# Patient Record
Sex: FEMALE | Race: BLACK OR AFRICAN AMERICAN | URBAN - METROPOLITAN AREA
[De-identification: names, ages, dates, MRNs, and addresses within clinical notes are randomized per-mention and may not be internally consistent; named-entity substitution may affect disease eponyms.]

---

## 2018-08-15 PROBLEM — R07.9 CHEST PAIN: Status: ACTIVE | Noted: 2018-08-15

## 2018-08-16 PROBLEM — J96.20 ACUTE ON CHRONIC RESPIRATORY FAILURE (HCC): Status: ACTIVE | Noted: 2018-08-16

## 2020-01-01 ENCOUNTER — APPOINTMENT (OUTPATIENT)
Dept: CT IMAGING | Age: 60
DRG: 871 | End: 2020-01-01
Payer: COMMERCIAL

## 2020-01-01 ENCOUNTER — APPOINTMENT (OUTPATIENT)
Dept: GENERAL RADIOLOGY | Age: 60
DRG: 871 | End: 2020-01-01
Payer: COMMERCIAL

## 2020-01-01 ENCOUNTER — HOSPITAL ENCOUNTER (INPATIENT)
Age: 60
LOS: 6 days | DRG: 871 | End: 2020-12-30
Attending: EMERGENCY MEDICINE | Admitting: INTERNAL MEDICINE
Payer: COMMERCIAL

## 2020-01-01 VITALS
HEART RATE: 108 BPM | BODY MASS INDEX: 27.89 KG/M2 | HEIGHT: 63 IN | RESPIRATION RATE: 16 BRPM | DIASTOLIC BLOOD PRESSURE: 52 MMHG | WEIGHT: 157.41 LBS | SYSTOLIC BLOOD PRESSURE: 88 MMHG | OXYGEN SATURATION: 95 % | TEMPERATURE: 98.3 F

## 2020-01-01 DIAGNOSIS — N28.9 ACUTE RENAL INSUFFICIENCY: ICD-10-CM

## 2020-01-01 DIAGNOSIS — L03.115 CELLULITIS OF RIGHT LOWER EXTREMITY: ICD-10-CM

## 2020-01-01 DIAGNOSIS — I73.9 PAD (PERIPHERAL ARTERY DISEASE) (HCC): ICD-10-CM

## 2020-01-01 DIAGNOSIS — A41.9 SEPTICEMIA (HCC): Primary | ICD-10-CM

## 2020-01-01 LAB
A/G RATIO: 0.9 (ref 1.1–2.2)
A/G RATIO: 0.9 (ref 1.1–2.2)
ACANTHOCYTES: ABNORMAL
ALBUMIN SERPL-MCNC: 2.7 G/DL (ref 3.4–5)
ALBUMIN SERPL-MCNC: 3 G/DL (ref 3.4–5)
ALP BLD-CCNC: 365 U/L (ref 40–129)
ALP BLD-CCNC: 424 U/L (ref 40–129)
ALT SERPL-CCNC: 10 U/L (ref 10–40)
ALT SERPL-CCNC: 10 U/L (ref 10–40)
ANION GAP SERPL CALCULATED.3IONS-SCNC: 13 MMOL/L (ref 3–16)
ANION GAP SERPL CALCULATED.3IONS-SCNC: 14 MMOL/L (ref 3–16)
ANION GAP SERPL CALCULATED.3IONS-SCNC: 15 MMOL/L (ref 3–16)
ANION GAP SERPL CALCULATED.3IONS-SCNC: 17 MMOL/L (ref 3–16)
ANION GAP SERPL CALCULATED.3IONS-SCNC: 17 MMOL/L (ref 3–16)
ANISOCYTOSIS: ABNORMAL
ANISOCYTOSIS: ABNORMAL
APTT: 151 SEC (ref 24.2–36.2)
APTT: 56.4 SEC (ref 24.2–36.2)
APTT: >248 SEC (ref 24.2–36.2)
AST SERPL-CCNC: 20 U/L (ref 15–37)
AST SERPL-CCNC: 22 U/L (ref 15–37)
BASE EXCESS ARTERIAL: -8 (ref -3–3)
BASOPHILS ABSOLUTE: 0 K/UL (ref 0–0.2)
BASOPHILS ABSOLUTE: 0.1 K/UL (ref 0–0.2)
BASOPHILS ABSOLUTE: 0.2 K/UL (ref 0–0.2)
BASOPHILS ABSOLUTE: 0.2 K/UL (ref 0–0.2)
BASOPHILS RELATIVE PERCENT: 0 %
BASOPHILS RELATIVE PERCENT: 0.4 %
BASOPHILS RELATIVE PERCENT: 0.5 %
BASOPHILS RELATIVE PERCENT: 0.7 %
BASOPHILS RELATIVE PERCENT: 0.7 %
BASOPHILS RELATIVE PERCENT: 0.8 %
BASOPHILS RELATIVE PERCENT: 0.9 %
BASOPHILS RELATIVE PERCENT: 1.3 %
BILIRUB SERPL-MCNC: 1.1 MG/DL (ref 0–1)
BILIRUB SERPL-MCNC: 1.2 MG/DL (ref 0–1)
BLOOD CULTURE, ROUTINE: NORMAL
BUN BLDV-MCNC: 33 MG/DL (ref 7–20)
BUN BLDV-MCNC: 33 MG/DL (ref 7–20)
BUN BLDV-MCNC: 34 MG/DL (ref 7–20)
BUN BLDV-MCNC: 35 MG/DL (ref 7–20)
BUN BLDV-MCNC: 36 MG/DL (ref 7–20)
BUN BLDV-MCNC: 36 MG/DL (ref 7–20)
BURR CELLS: ABNORMAL
C-REACTIVE PROTEIN: 196.1 MG/L (ref 0–5.1)
CALCIUM IONIZED: 0.72 MMOL/L (ref 1.12–1.32)
CALCIUM IONIZED: 0.84 MMOL/L (ref 1.12–1.32)
CALCIUM IONIZED: 0.86 MMOL/L (ref 1.12–1.32)
CALCIUM IONIZED: 0.89 MMOL/L (ref 1.12–1.32)
CALCIUM IONIZED: 1.02 MMOL/L (ref 1.12–1.32)
CALCIUM IONIZED: 1.03 MMOL/L (ref 1.12–1.32)
CALCIUM IONIZED: 1.09 MMOL/L (ref 1.12–1.32)
CALCIUM IONIZED: 1.17 MMOL/L (ref 1.12–1.32)
CALCIUM SERPL-MCNC: 5.6 MG/DL (ref 8.3–10.6)
CALCIUM SERPL-MCNC: 5.8 MG/DL (ref 8.3–10.6)
CALCIUM SERPL-MCNC: 5.9 MG/DL (ref 8.3–10.6)
CALCIUM SERPL-MCNC: 6.1 MG/DL (ref 8.3–10.6)
CALCIUM SERPL-MCNC: 6.6 MG/DL (ref 8.3–10.6)
CALCIUM SERPL-MCNC: 7.5 MG/DL (ref 8.3–10.6)
CALCIUM SERPL-MCNC: 8.4 MG/DL (ref 8.3–10.6)
CALCIUM SERPL-MCNC: 8.5 MG/DL (ref 8.3–10.6)
CALCIUM SERPL-MCNC: 8.7 MG/DL (ref 8.3–10.6)
CHLORIDE BLD-SCNC: 101 MMOL/L (ref 99–110)
CHLORIDE BLD-SCNC: 101 MMOL/L (ref 99–110)
CHLORIDE BLD-SCNC: 102 MMOL/L (ref 99–110)
CHLORIDE BLD-SCNC: 103 MMOL/L (ref 99–110)
CHLORIDE BLD-SCNC: 104 MMOL/L (ref 99–110)
CHLORIDE BLD-SCNC: 105 MMOL/L (ref 99–110)
CO2: 17 MMOL/L (ref 21–32)
CO2: 18 MMOL/L (ref 21–32)
CO2: 18 MMOL/L (ref 21–32)
CO2: 19 MMOL/L (ref 21–32)
CO2: 20 MMOL/L (ref 21–32)
CO2: 20 MMOL/L (ref 21–32)
CREAT SERPL-MCNC: 1.3 MG/DL (ref 0.6–1.1)
CREAT SERPL-MCNC: 1.5 MG/DL (ref 0.6–1.1)
CREAT SERPL-MCNC: 1.7 MG/DL (ref 0.6–1.1)
CREAT SERPL-MCNC: 1.8 MG/DL (ref 0.6–1.1)
CULTURE, BLOOD 2: NORMAL
D DIMER: 2142 NG/ML DDU (ref 0–229)
EOSINOPHILS ABSOLUTE: 0 K/UL (ref 0–0.6)
EOSINOPHILS ABSOLUTE: 0.1 K/UL (ref 0–0.6)
EOSINOPHILS ABSOLUTE: 0.2 K/UL (ref 0–0.6)
EOSINOPHILS ABSOLUTE: 0.4 K/UL (ref 0–0.6)
EOSINOPHILS RELATIVE PERCENT: 0.3 %
EOSINOPHILS RELATIVE PERCENT: 0.5 %
EOSINOPHILS RELATIVE PERCENT: 0.6 %
EOSINOPHILS RELATIVE PERCENT: 0.9 %
EOSINOPHILS RELATIVE PERCENT: 0.9 %
EOSINOPHILS RELATIVE PERCENT: 1.9 %
FERRITIN: 245.3 NG/ML (ref 15–150)
GFR AFRICAN AMERICAN: 35
GFR AFRICAN AMERICAN: 37
GFR AFRICAN AMERICAN: 43
GFR AFRICAN AMERICAN: 51
GFR NON-AFRICAN AMERICAN: 29
GFR NON-AFRICAN AMERICAN: 31
GFR NON-AFRICAN AMERICAN: 36
GFR NON-AFRICAN AMERICAN: 42
GLOBULIN: 3 G/DL
GLOBULIN: 3.2 G/DL
GLUCOSE BLD-MCNC: 102 MG/DL (ref 70–99)
GLUCOSE BLD-MCNC: 106 MG/DL (ref 70–99)
GLUCOSE BLD-MCNC: 115 MG/DL (ref 70–99)
GLUCOSE BLD-MCNC: 121 MG/DL (ref 70–99)
GLUCOSE BLD-MCNC: 123 MG/DL (ref 70–99)
GLUCOSE BLD-MCNC: 126 MG/DL (ref 70–99)
GLUCOSE BLD-MCNC: 131 MG/DL (ref 70–99)
GLUCOSE BLD-MCNC: 71 MG/DL (ref 70–99)
GLUCOSE BLD-MCNC: 74 MG/DL (ref 70–99)
GLUCOSE BLD-MCNC: 89 MG/DL (ref 70–99)
HCO3 ARTERIAL: 16.2 MMOL/L (ref 21–29)
HCT VFR BLD CALC: 24.8 % (ref 36–48)
HCT VFR BLD CALC: 25.8 % (ref 36–48)
HCT VFR BLD CALC: 26.5 % (ref 36–48)
HCT VFR BLD CALC: 26.7 % (ref 36–48)
HCT VFR BLD CALC: 28.2 % (ref 36–48)
HCT VFR BLD CALC: 28.3 % (ref 36–48)
HCT VFR BLD CALC: 28.8 % (ref 36–48)
HCT VFR BLD CALC: 29.3 % (ref 36–48)
HEMOGLOBIN: 10.2 GM/DL (ref 12–16)
HEMOGLOBIN: 7.8 G/DL (ref 12–16)
HEMOGLOBIN: 7.9 G/DL (ref 12–16)
HEMOGLOBIN: 8.2 G/DL (ref 12–16)
HEMOGLOBIN: 8.2 G/DL (ref 12–16)
HEMOGLOBIN: 8.4 G/DL (ref 12–16)
HEMOGLOBIN: 8.7 G/DL (ref 12–16)
HEMOGLOBIN: 9.1 G/DL (ref 12–16)
HEMOGLOBIN: 9.1 G/DL (ref 12–16)
INR BLD: 1.89 (ref 0.86–1.14)
INR BLD: 1.9 (ref 0.86–1.14)
IRON SATURATION: 8 % (ref 15–50)
IRON: 13 UG/DL (ref 37–145)
LACTATE: 2.89 MMOL/L (ref 0.4–2)
LACTIC ACID, SEPSIS: 1 MMOL/L (ref 0.4–1.9)
LACTIC ACID, SEPSIS: 1.9 MMOL/L (ref 0.4–1.9)
LACTIC ACID, SEPSIS: 2.2 MMOL/L (ref 0.4–1.9)
LACTIC ACID: 1.8 MMOL/L (ref 0.4–2)
LACTIC ACID: 2 MMOL/L (ref 0.4–2)
LACTIC ACID: 2.8 MMOL/L (ref 0.4–2)
LACTIC ACID: 2.8 MMOL/L (ref 0.4–2)
LV EF: 58 %
LVEF MODALITY: NORMAL
LYMPHOCYTES ABSOLUTE: 0.8 K/UL (ref 1–5.1)
LYMPHOCYTES ABSOLUTE: 1.1 K/UL (ref 1–5.1)
LYMPHOCYTES ABSOLUTE: 1.1 K/UL (ref 1–5.1)
LYMPHOCYTES ABSOLUTE: 1.2 K/UL (ref 1–5.1)
LYMPHOCYTES ABSOLUTE: 1.2 K/UL (ref 1–5.1)
LYMPHOCYTES ABSOLUTE: 1.4 K/UL (ref 1–5.1)
LYMPHOCYTES RELATIVE PERCENT: 10 %
LYMPHOCYTES RELATIVE PERCENT: 4.3 %
LYMPHOCYTES RELATIVE PERCENT: 5.4 %
LYMPHOCYTES RELATIVE PERCENT: 5.8 %
LYMPHOCYTES RELATIVE PERCENT: 6 %
LYMPHOCYTES RELATIVE PERCENT: 6.4 %
LYMPHOCYTES RELATIVE PERCENT: 7.1 %
LYMPHOCYTES RELATIVE PERCENT: 9.1 %
MAGNESIUM: 0.5 MG/DL (ref 1.8–2.4)
MAGNESIUM: 0.5 MG/DL (ref 1.8–2.4)
MAGNESIUM: 1.5 MG/DL (ref 1.8–2.4)
MAGNESIUM: 1.8 MG/DL (ref 1.8–2.4)
MAGNESIUM: 2 MG/DL (ref 1.8–2.4)
MCH RBC QN AUTO: 27.7 PG (ref 26–34)
MCH RBC QN AUTO: 27.8 PG (ref 26–34)
MCH RBC QN AUTO: 27.9 PG (ref 26–34)
MCH RBC QN AUTO: 28 PG (ref 26–34)
MCH RBC QN AUTO: 28.1 PG (ref 26–34)
MCH RBC QN AUTO: 28.3 PG (ref 26–34)
MCH RBC QN AUTO: 28.5 PG (ref 26–34)
MCH RBC QN AUTO: 28.7 PG (ref 26–34)
MCHC RBC AUTO-ENTMCNC: 29.7 G/DL (ref 31–36)
MCHC RBC AUTO-ENTMCNC: 30.5 G/DL (ref 31–36)
MCHC RBC AUTO-ENTMCNC: 30.8 G/DL (ref 31–36)
MCHC RBC AUTO-ENTMCNC: 30.9 G/DL (ref 31–36)
MCHC RBC AUTO-ENTMCNC: 31.1 G/DL (ref 31–36)
MCHC RBC AUTO-ENTMCNC: 31.1 G/DL (ref 31–36)
MCHC RBC AUTO-ENTMCNC: 31.3 G/DL (ref 31–36)
MCHC RBC AUTO-ENTMCNC: 31.5 G/DL (ref 31–36)
MCV RBC AUTO: 90 FL (ref 80–100)
MCV RBC AUTO: 90 FL (ref 80–100)
MCV RBC AUTO: 90.4 FL (ref 80–100)
MCV RBC AUTO: 90.5 FL (ref 80–100)
MCV RBC AUTO: 90.5 FL (ref 80–100)
MCV RBC AUTO: 91.1 FL (ref 80–100)
MCV RBC AUTO: 92.4 FL (ref 80–100)
MCV RBC AUTO: 94.7 FL (ref 80–100)
MONOCYTES ABSOLUTE: 0.9 K/UL (ref 0–1.3)
MONOCYTES ABSOLUTE: 1 K/UL (ref 0–1.3)
MONOCYTES ABSOLUTE: 1.3 K/UL (ref 0–1.3)
MONOCYTES RELATIVE PERCENT: 5 %
MONOCYTES RELATIVE PERCENT: 5.2 %
MONOCYTES RELATIVE PERCENT: 5.6 %
MONOCYTES RELATIVE PERCENT: 6.2 %
MONOCYTES RELATIVE PERCENT: 6.4 %
MONOCYTES RELATIVE PERCENT: 6.8 %
MONOCYTES RELATIVE PERCENT: 6.8 %
MONOCYTES RELATIVE PERCENT: 7.3 %
MRSA SCREEN RT-PCR: NORMAL
NEUTROPHILS ABSOLUTE: 11.9 K/UL (ref 1.7–7.7)
NEUTROPHILS ABSOLUTE: 12.6 K/UL (ref 1.7–7.7)
NEUTROPHILS ABSOLUTE: 13.3 K/UL (ref 1.7–7.7)
NEUTROPHILS ABSOLUTE: 15 K/UL (ref 1.7–7.7)
NEUTROPHILS ABSOLUTE: 15 K/UL (ref 1.7–7.7)
NEUTROPHILS ABSOLUTE: 15.8 K/UL (ref 1.7–7.7)
NEUTROPHILS ABSOLUTE: 16.3 K/UL (ref 1.7–7.7)
NEUTROPHILS ABSOLUTE: 9.8 K/UL (ref 1.7–7.7)
NEUTROPHILS RELATIVE PERCENT: 81.2 %
NEUTROPHILS RELATIVE PERCENT: 82.7 %
NEUTROPHILS RELATIVE PERCENT: 85.6 %
NEUTROPHILS RELATIVE PERCENT: 86.4 %
NEUTROPHILS RELATIVE PERCENT: 86.6 %
NEUTROPHILS RELATIVE PERCENT: 87.3 %
NEUTROPHILS RELATIVE PERCENT: 87.5 %
NEUTROPHILS RELATIVE PERCENT: 87.7 %
O2 SAT, ARTERIAL: 98 % (ref 93–100)
OVALOCYTES: ABNORMAL
OVALOCYTES: ABNORMAL
PCO2 ARTERIAL: 22.6 MM HG (ref 35–45)
PDW BLD-RTO: 21.1 % (ref 12.4–15.4)
PDW BLD-RTO: 21.2 % (ref 12.4–15.4)
PDW BLD-RTO: 21.4 % (ref 12.4–15.4)
PDW BLD-RTO: 22.3 % (ref 12.4–15.4)
PDW BLD-RTO: 22.4 % (ref 12.4–15.4)
PERFORMED ON: ABNORMAL
PERFORMED ON: ABNORMAL
PH ARTERIAL: 7.46 (ref 7.35–7.45)
PH VENOUS: 7.39 (ref 7.35–7.45)
PH VENOUS: 7.4 (ref 7.35–7.45)
PH VENOUS: 7.41 (ref 7.35–7.45)
PH VENOUS: 7.42 (ref 7.35–7.45)
PH VENOUS: 7.43 (ref 7.35–7.45)
PH VENOUS: 7.44 (ref 7.35–7.45)
PH VENOUS: 7.48 (ref 7.35–7.45)
PHOSPHORUS: 2.8 MG/DL (ref 2.5–4.9)
PHOSPHORUS: 2.9 MG/DL (ref 2.5–4.9)
PHOSPHORUS: 2.9 MG/DL (ref 2.5–4.9)
PHOSPHORUS: 3 MG/DL (ref 2.5–4.9)
PHOSPHORUS: 3 MG/DL (ref 2.5–4.9)
PHOSPHORUS: 3.1 MG/DL (ref 2.5–4.9)
PHOSPHORUS: 3.2 MG/DL (ref 2.5–4.9)
PLATELET # BLD: 231 K/UL (ref 135–450)
PLATELET # BLD: 243 K/UL (ref 135–450)
PLATELET # BLD: 251 K/UL (ref 135–450)
PLATELET # BLD: 251 K/UL (ref 135–450)
PLATELET # BLD: 285 K/UL (ref 135–450)
PLATELET # BLD: 307 K/UL (ref 135–450)
PLATELET # BLD: 326 K/UL (ref 135–450)
PLATELET # BLD: 379 K/UL (ref 135–450)
PLATELET SLIDE REVIEW: ABNORMAL
PLATELET SLIDE REVIEW: ADEQUATE
PMV BLD AUTO: 10.5 FL (ref 5–10.5)
PMV BLD AUTO: 9.1 FL (ref 5–10.5)
PMV BLD AUTO: 9.2 FL (ref 5–10.5)
PMV BLD AUTO: 9.5 FL (ref 5–10.5)
PMV BLD AUTO: 9.6 FL (ref 5–10.5)
PO2 ARTERIAL: 96.1 MM HG (ref 75–108)
POC HEMATOCRIT: 30 % (ref 36–48)
POC POTASSIUM: 3.5 MMOL/L (ref 3.5–5.1)
POC SAMPLE TYPE: ABNORMAL
POC SODIUM: 139 MMOL/L (ref 136–145)
POIKILOCYTES: ABNORMAL
POIKILOCYTES: ABNORMAL
POLYCHROMASIA: ABNORMAL
POLYCHROMASIA: ABNORMAL
POTASSIUM REFLEX MAGNESIUM: 3.7 MMOL/L (ref 3.5–5.1)
POTASSIUM SERPL-SCNC: 3.3 MMOL/L (ref 3.5–5.1)
POTASSIUM SERPL-SCNC: 3.5 MMOL/L (ref 3.5–5.1)
POTASSIUM SERPL-SCNC: 3.6 MMOL/L (ref 3.5–5.1)
POTASSIUM SERPL-SCNC: 3.7 MMOL/L (ref 3.5–5.1)
POTASSIUM SERPL-SCNC: 3.8 MMOL/L (ref 3.5–5.1)
POTASSIUM SERPL-SCNC: 3.8 MMOL/L (ref 3.5–5.1)
POTASSIUM SERPL-SCNC: 4 MMOL/L (ref 3.5–5.1)
PRO-BNP: ABNORMAL PG/ML (ref 0–124)
PRO-BNP: ABNORMAL PG/ML (ref 0–124)
PROCALCITONIN: 33.7 NG/ML (ref 0–0.15)
PROTHROMBIN TIME: 22.1 SEC (ref 10–13.2)
PROTHROMBIN TIME: 22.2 SEC (ref 10–13.2)
RBC # BLD: 2.74 M/UL (ref 4–5.2)
RBC # BLD: 2.83 M/UL (ref 4–5.2)
RBC # BLD: 2.87 M/UL (ref 4–5.2)
RBC # BLD: 2.95 M/UL (ref 4–5.2)
RBC # BLD: 2.99 M/UL (ref 4–5.2)
RBC # BLD: 3.13 M/UL (ref 4–5.2)
RBC # BLD: 3.19 M/UL (ref 4–5.2)
RBC # BLD: 3.26 M/UL (ref 4–5.2)
REASON FOR REJECTION: NORMAL
REASON FOR REJECTION: NORMAL
REJECTED TEST: NORMAL
REJECTED TEST: NORMAL
SARS-COV-2, PCR: NOT DETECTED
SCHISTOCYTES: ABNORMAL
SCHISTOCYTES: ABNORMAL
SEDIMENTATION RATE, ERYTHROCYTE: 24 MM/HR (ref 0–30)
SLIDE REVIEW: ABNORMAL
SLIDE REVIEW: ABNORMAL
SODIUM BLD-SCNC: 135 MMOL/L (ref 136–145)
SODIUM BLD-SCNC: 136 MMOL/L (ref 136–145)
SODIUM BLD-SCNC: 139 MMOL/L (ref 136–145)
SODIUM BLD-SCNC: 139 MMOL/L (ref 136–145)
TCO2 ARTERIAL: 17 MMOL/L
TOTAL CK: 139 U/L (ref 26–192)
TOTAL IRON BINDING CAPACITY: 160 UG/DL (ref 260–445)
TOTAL PROTEIN: 5.7 G/DL (ref 6.4–8.2)
TOTAL PROTEIN: 6.2 G/DL (ref 6.4–8.2)
VANCOMYCIN RANDOM: 11.3 UG/ML
VANCOMYCIN RANDOM: 6.9 UG/ML
WBC # BLD: 12.1 K/UL (ref 4–11)
WBC # BLD: 13.9 K/UL (ref 4–11)
WBC # BLD: 15.1 K/UL (ref 4–11)
WBC # BLD: 15.3 K/UL (ref 4–11)
WBC # BLD: 17.1 K/UL (ref 4–11)
WBC # BLD: 17.3 K/UL (ref 4–11)
WBC # BLD: 18.3 K/UL (ref 4–11)
WBC # BLD: 18.6 K/UL (ref 4–11)

## 2020-01-01 PROCEDURE — 84100 ASSAY OF PHOSPHORUS: CPT

## 2020-01-01 PROCEDURE — 83735 ASSAY OF MAGNESIUM: CPT

## 2020-01-01 PROCEDURE — 87641 MR-STAPH DNA AMP PROBE: CPT

## 2020-01-01 PROCEDURE — 96361 HYDRATE IV INFUSION ADD-ON: CPT

## 2020-01-01 PROCEDURE — 6360000002 HC RX W HCPCS: Performed by: INTERNAL MEDICINE

## 2020-01-01 PROCEDURE — 82728 ASSAY OF FERRITIN: CPT

## 2020-01-01 PROCEDURE — APPNB180 APP NON BILLABLE TIME > 60 MINS: Performed by: PHYSICIAN ASSISTANT

## 2020-01-01 PROCEDURE — 80048 BASIC METABOLIC PNL TOTAL CA: CPT

## 2020-01-01 PROCEDURE — 36415 COLL VENOUS BLD VENIPUNCTURE: CPT

## 2020-01-01 PROCEDURE — 2580000003 HC RX 258: Performed by: INTERNAL MEDICINE

## 2020-01-01 PROCEDURE — 99222 1ST HOSP IP/OBS MODERATE 55: CPT | Performed by: SURGERY

## 2020-01-01 PROCEDURE — 85025 COMPLETE CBC W/AUTO DIFF WBC: CPT

## 2020-01-01 PROCEDURE — 85652 RBC SED RATE AUTOMATED: CPT

## 2020-01-01 PROCEDURE — 6370000000 HC RX 637 (ALT 250 FOR IP): Performed by: INTERNAL MEDICINE

## 2020-01-01 PROCEDURE — 96375 TX/PRO/DX INJ NEW DRUG ADDON: CPT

## 2020-01-01 PROCEDURE — 87040 BLOOD CULTURE FOR BACTERIA: CPT

## 2020-01-01 PROCEDURE — 99223 1ST HOSP IP/OBS HIGH 75: CPT | Performed by: INTERNAL MEDICINE

## 2020-01-01 PROCEDURE — 99222 1ST HOSP IP/OBS MODERATE 55: CPT | Performed by: INTERNAL MEDICINE

## 2020-01-01 PROCEDURE — 82330 ASSAY OF CALCIUM: CPT

## 2020-01-01 PROCEDURE — 94640 AIRWAY INHALATION TREATMENT: CPT

## 2020-01-01 PROCEDURE — 83605 ASSAY OF LACTIC ACID: CPT

## 2020-01-01 PROCEDURE — 6360000002 HC RX W HCPCS: Performed by: PHYSICIAN ASSISTANT

## 2020-01-01 PROCEDURE — 99284 EMERGENCY DEPT VISIT MOD MDM: CPT

## 2020-01-01 PROCEDURE — 1200000000 HC SEMI PRIVATE

## 2020-01-01 PROCEDURE — 85730 THROMBOPLASTIN TIME PARTIAL: CPT

## 2020-01-01 PROCEDURE — 84132 ASSAY OF SERUM POTASSIUM: CPT

## 2020-01-01 PROCEDURE — 94761 N-INVAS EAR/PLS OXIMETRY MLT: CPT

## 2020-01-01 PROCEDURE — 83540 ASSAY OF IRON: CPT

## 2020-01-01 PROCEDURE — 2700000000 HC OXYGEN THERAPY PER DAY

## 2020-01-01 PROCEDURE — APPSS180 APP SPLIT SHARED TIME > 60 MINUTES: Performed by: PHYSICIAN ASSISTANT

## 2020-01-01 PROCEDURE — 73700 CT LOWER EXTREMITY W/O DYE: CPT

## 2020-01-01 PROCEDURE — 97530 THERAPEUTIC ACTIVITIES: CPT

## 2020-01-01 PROCEDURE — 80202 ASSAY OF VANCOMYCIN: CPT

## 2020-01-01 PROCEDURE — 82803 BLOOD GASES ANY COMBINATION: CPT

## 2020-01-01 PROCEDURE — 73590 X-RAY EXAM OF LOWER LEG: CPT

## 2020-01-01 PROCEDURE — 83880 ASSAY OF NATRIURETIC PEPTIDE: CPT

## 2020-01-01 PROCEDURE — 6370000000 HC RX 637 (ALT 250 FOR IP): Performed by: EMERGENCY MEDICINE

## 2020-01-01 PROCEDURE — 02HV33Z INSERTION OF INFUSION DEVICE INTO SUPERIOR VENA CAVA, PERCUTANEOUS APPROACH: ICD-10-PCS | Performed by: INTERNAL MEDICINE

## 2020-01-01 PROCEDURE — 80053 COMPREHEN METABOLIC PANEL: CPT

## 2020-01-01 PROCEDURE — 2580000003 HC RX 258: Performed by: PHYSICIAN ASSISTANT

## 2020-01-01 PROCEDURE — 6360000002 HC RX W HCPCS: Performed by: NURSE PRACTITIONER

## 2020-01-01 PROCEDURE — 82550 ASSAY OF CK (CPK): CPT

## 2020-01-01 PROCEDURE — 85379 FIBRIN DEGRADATION QUANT: CPT

## 2020-01-01 PROCEDURE — 71045 X-RAY EXAM CHEST 1 VIEW: CPT

## 2020-01-01 PROCEDURE — 82947 ASSAY GLUCOSE BLOOD QUANT: CPT

## 2020-01-01 PROCEDURE — 84295 ASSAY OF SERUM SODIUM: CPT

## 2020-01-01 PROCEDURE — 93970 EXTREMITY STUDY: CPT

## 2020-01-01 PROCEDURE — 96365 THER/PROPH/DIAG IV INF INIT: CPT

## 2020-01-01 PROCEDURE — 93306 TTE W/DOPPLER COMPLETE: CPT

## 2020-01-01 PROCEDURE — 2500000003 HC RX 250 WO HCPCS: Performed by: INTERNAL MEDICINE

## 2020-01-01 PROCEDURE — 6370000000 HC RX 637 (ALT 250 FOR IP): Performed by: NURSE PRACTITIONER

## 2020-01-01 PROCEDURE — 85610 PROTHROMBIN TIME: CPT

## 2020-01-01 PROCEDURE — 83550 IRON BINDING TEST: CPT

## 2020-01-01 PROCEDURE — 2060000000 HC ICU INTERMEDIATE R&B

## 2020-01-01 PROCEDURE — 85014 HEMATOCRIT: CPT

## 2020-01-01 PROCEDURE — 6360000004 HC RX CONTRAST MEDICATION: Performed by: SURGERY

## 2020-01-01 PROCEDURE — 97162 PT EVAL MOD COMPLEX 30 MIN: CPT

## 2020-01-01 PROCEDURE — 84145 PROCALCITONIN (PCT): CPT

## 2020-01-01 PROCEDURE — 76937 US GUIDE VASCULAR ACCESS: CPT

## 2020-01-01 PROCEDURE — 82310 ASSAY OF CALCIUM: CPT

## 2020-01-01 PROCEDURE — 36569 INSJ PICC 5 YR+ W/O IMAGING: CPT

## 2020-01-01 PROCEDURE — 86140 C-REACTIVE PROTEIN: CPT

## 2020-01-01 PROCEDURE — U0003 INFECTIOUS AGENT DETECTION BY NUCLEIC ACID (DNA OR RNA); SEVERE ACUTE RESPIRATORY SYNDROME CORONAVIRUS 2 (SARS-COV-2) (CORONAVIRUS DISEASE [COVID-19]), AMPLIFIED PROBE TECHNIQUE, MAKING USE OF HIGH THROUGHPUT TECHNOLOGIES AS DESCRIBED BY CMS-2020-01-R: HCPCS

## 2020-01-01 PROCEDURE — 36600 WITHDRAWAL OF ARTERIAL BLOOD: CPT

## 2020-01-01 PROCEDURE — 99233 SBSQ HOSP IP/OBS HIGH 50: CPT | Performed by: INTERNAL MEDICINE

## 2020-01-01 PROCEDURE — 74176 CT ABD & PELVIS W/O CONTRAST: CPT

## 2020-01-01 RX ORDER — ONDANSETRON 2 MG/ML
4 INJECTION INTRAMUSCULAR; INTRAVENOUS EVERY 6 HOURS PRN
Status: DISCONTINUED | OUTPATIENT
Start: 2020-01-01 | End: 2020-01-01 | Stop reason: HOSPADM

## 2020-01-01 RX ORDER — OXYCODONE HYDROCHLORIDE AND ACETAMINOPHEN 5; 325 MG/1; MG/1
1 TABLET ORAL EVERY 8 HOURS PRN
Status: DISCONTINUED | OUTPATIENT
Start: 2020-01-01 | End: 2020-01-01

## 2020-01-01 RX ORDER — FUROSEMIDE 10 MG/ML
40 INJECTION INTRAMUSCULAR; INTRAVENOUS 2 TIMES DAILY
Status: DISCONTINUED | OUTPATIENT
Start: 2020-01-01 | End: 2020-01-01

## 2020-01-01 RX ORDER — NYSTATIN 100000 [USP'U]/G
POWDER TOPICAL 2 TIMES DAILY
COMMUNITY

## 2020-01-01 RX ORDER — IPRATROPIUM BROMIDE AND ALBUTEROL SULFATE 2.5; .5 MG/3ML; MG/3ML
1 SOLUTION RESPIRATORY (INHALATION) ONCE
Status: COMPLETED | OUTPATIENT
Start: 2020-01-01 | End: 2020-01-01

## 2020-01-01 RX ORDER — OXYCODONE HYDROCHLORIDE AND ACETAMINOPHEN 5; 325 MG/1; MG/1
1 TABLET ORAL EVERY 4 HOURS PRN
COMMUNITY

## 2020-01-01 RX ORDER — LIDOCAINE HYDROCHLORIDE 10 MG/ML
5 INJECTION, SOLUTION EPIDURAL; INFILTRATION; INTRACAUDAL; PERINEURAL ONCE
Status: DISCONTINUED | OUTPATIENT
Start: 2020-01-01 | End: 2020-01-01 | Stop reason: HOSPADM

## 2020-01-01 RX ORDER — MAGNESIUM SULFATE IN WATER 40 MG/ML
2 INJECTION, SOLUTION INTRAVENOUS ONCE
Status: DISCONTINUED | OUTPATIENT
Start: 2020-01-01 | End: 2020-01-01

## 2020-01-01 RX ORDER — BISACODYL 10 MG
10 SUPPOSITORY, RECTAL RECTAL DAILY PRN
COMMUNITY

## 2020-01-01 RX ORDER — CALCIUM GLUCONATE 20 MG/ML
1 INJECTION, SOLUTION INTRAVENOUS ONCE
Status: COMPLETED | OUTPATIENT
Start: 2020-01-01 | End: 2020-01-01

## 2020-01-01 RX ORDER — NYSTATIN 100000 U/G
OINTMENT TOPICAL 2 TIMES DAILY
Status: DISCONTINUED | OUTPATIENT
Start: 2020-01-01 | End: 2020-01-01 | Stop reason: HOSPADM

## 2020-01-01 RX ORDER — MAGNESIUM SULFATE 1 G/100ML
1 INJECTION INTRAVENOUS ONCE
Status: DISCONTINUED | OUTPATIENT
Start: 2020-01-01 | End: 2020-01-01

## 2020-01-01 RX ORDER — POLYETHYLENE GLYCOL 3350 17 G/17G
17 POWDER, FOR SOLUTION ORAL DAILY
Status: DISCONTINUED | OUTPATIENT
Start: 2020-01-01 | End: 2020-01-01 | Stop reason: HOSPADM

## 2020-01-01 RX ORDER — HEPARIN SODIUM 1000 [USP'U]/ML
4800 INJECTION, SOLUTION INTRAVENOUS; SUBCUTANEOUS ONCE
Status: COMPLETED | OUTPATIENT
Start: 2020-01-01 | End: 2020-01-01

## 2020-01-01 RX ORDER — SODIUM CHLORIDE 0.9 % (FLUSH) 0.9 %
10 SYRINGE (ML) INJECTION PRN
Status: DISCONTINUED | OUTPATIENT
Start: 2020-01-01 | End: 2020-01-01

## 2020-01-01 RX ORDER — ATORVASTATIN CALCIUM 20 MG/1
20 TABLET, FILM COATED ORAL NIGHTLY
Status: DISCONTINUED | OUTPATIENT
Start: 2020-01-01 | End: 2020-01-01 | Stop reason: HOSPADM

## 2020-01-01 RX ORDER — LINEZOLID 2 MG/ML
600 INJECTION, SOLUTION INTRAVENOUS EVERY 12 HOURS
Status: DISCONTINUED | OUTPATIENT
Start: 2020-01-01 | End: 2020-01-01 | Stop reason: HOSPADM

## 2020-01-01 RX ORDER — TORSEMIDE 20 MG/1
40 TABLET ORAL 2 TIMES DAILY
COMMUNITY

## 2020-01-01 RX ORDER — SODIUM CHLORIDE 0.9 % (FLUSH) 0.9 %
10 SYRINGE (ML) INJECTION EVERY 12 HOURS SCHEDULED
Status: DISCONTINUED | OUTPATIENT
Start: 2020-01-01 | End: 2020-01-01 | Stop reason: HOSPADM

## 2020-01-01 RX ORDER — FUROSEMIDE 10 MG/ML
20 INJECTION INTRAMUSCULAR; INTRAVENOUS 2 TIMES DAILY
Status: DISCONTINUED | OUTPATIENT
Start: 2020-01-01 | End: 2020-01-01

## 2020-01-01 RX ORDER — MAGNESIUM SULFATE IN WATER 40 MG/ML
2 INJECTION, SOLUTION INTRAVENOUS ONCE
Status: COMPLETED | OUTPATIENT
Start: 2020-01-01 | End: 2020-01-01

## 2020-01-01 RX ORDER — MORPHINE SULFATE 4 MG/ML
4 INJECTION, SOLUTION INTRAMUSCULAR; INTRAVENOUS ONCE
Status: DISCONTINUED | OUTPATIENT
Start: 2020-01-01 | End: 2020-01-01 | Stop reason: HOSPADM

## 2020-01-01 RX ORDER — MAGNESIUM OXIDE 400 MG/1
400 TABLET ORAL DAILY
COMMUNITY

## 2020-01-01 RX ORDER — POTASSIUM CHLORIDE 20 MEQ/1
20 TABLET, EXTENDED RELEASE ORAL DAILY
COMMUNITY

## 2020-01-01 RX ORDER — ACETAMINOPHEN 325 MG/1
650 TABLET ORAL EVERY 6 HOURS PRN
Status: DISCONTINUED | OUTPATIENT
Start: 2020-01-01 | End: 2020-01-01 | Stop reason: HOSPADM

## 2020-01-01 RX ORDER — OXYCODONE HYDROCHLORIDE AND ACETAMINOPHEN 5; 325 MG/1; MG/1
1 TABLET ORAL EVERY 6 HOURS PRN
Status: DISCONTINUED | OUTPATIENT
Start: 2020-01-01 | End: 2020-01-01 | Stop reason: HOSPADM

## 2020-01-01 RX ORDER — MIDODRINE HYDROCHLORIDE 10 MG/1
10 TABLET ORAL
Status: DISCONTINUED | OUTPATIENT
Start: 2020-01-01 | End: 2020-01-01 | Stop reason: HOSPADM

## 2020-01-01 RX ORDER — SODIUM CHLORIDE 9 MG/ML
INJECTION, SOLUTION INTRAVENOUS CONTINUOUS
Status: DISCONTINUED | OUTPATIENT
Start: 2020-01-01 | End: 2020-01-01

## 2020-01-01 RX ORDER — SILDENAFIL CITRATE 20 MG/1
20 TABLET ORAL 3 TIMES DAILY
COMMUNITY

## 2020-01-01 RX ORDER — LANOLIN ALCOHOL/MO/W.PET/CERES
325 CREAM (GRAM) TOPICAL
COMMUNITY

## 2020-01-01 RX ORDER — ALLOPURINOL 100 MG/1
200 TABLET ORAL DAILY
COMMUNITY

## 2020-01-01 RX ORDER — POLYETHYLENE GLYCOL 3350 17 G/17G
17 POWDER, FOR SOLUTION ORAL DAILY PRN
COMMUNITY

## 2020-01-01 RX ORDER — HEPARIN SODIUM 10000 [USP'U]/100ML
7.1 INJECTION, SOLUTION INTRAVENOUS CONTINUOUS
Status: DISCONTINUED | OUTPATIENT
Start: 2020-01-01 | End: 2020-01-01

## 2020-01-01 RX ORDER — MIDODRINE HYDROCHLORIDE 10 MG/1
10 TABLET ORAL ONCE
Status: COMPLETED | OUTPATIENT
Start: 2020-01-01 | End: 2020-01-01

## 2020-01-01 RX ORDER — BISACODYL 10 MG
10 SUPPOSITORY, RECTAL RECTAL DAILY PRN
Status: DISCONTINUED | OUTPATIENT
Start: 2020-01-01 | End: 2020-01-01 | Stop reason: HOSPADM

## 2020-01-01 RX ORDER — MIDODRINE HYDROCHLORIDE 10 MG/1
10 TABLET ORAL 3 TIMES DAILY
COMMUNITY

## 2020-01-01 RX ORDER — SODIUM CHLORIDE 0.9 % (FLUSH) 0.9 %
10 SYRINGE (ML) INJECTION EVERY 12 HOURS SCHEDULED
Status: DISCONTINUED | OUTPATIENT
Start: 2020-01-01 | End: 2020-01-01

## 2020-01-01 RX ORDER — LANOLIN ALCOHOL/MO/W.PET/CERES
400 CREAM (GRAM) TOPICAL DAILY
Status: DISCONTINUED | OUTPATIENT
Start: 2020-01-01 | End: 2020-01-01 | Stop reason: HOSPADM

## 2020-01-01 RX ORDER — HYDROXYZINE HYDROCHLORIDE 25 MG/1
25 TABLET, FILM COATED ORAL EVERY 8 HOURS PRN
Status: DISCONTINUED | OUTPATIENT
Start: 2020-01-01 | End: 2020-01-01 | Stop reason: HOSPADM

## 2020-01-01 RX ORDER — SODIUM CHLORIDE 0.9 % (FLUSH) 0.9 %
10 SYRINGE (ML) INJECTION PRN
Status: DISCONTINUED | OUTPATIENT
Start: 2020-01-01 | End: 2020-01-01 | Stop reason: HOSPADM

## 2020-01-01 RX ORDER — FERROUS SULFATE TAB EC 324 MG (65 MG FE EQUIVALENT) 324 (65 FE) MG
324 TABLET DELAYED RESPONSE ORAL
Status: DISCONTINUED | OUTPATIENT
Start: 2020-01-01 | End: 2020-01-01 | Stop reason: HOSPADM

## 2020-01-01 RX ORDER — ACETAMINOPHEN 650 MG/1
650 SUPPOSITORY RECTAL EVERY 6 HOURS PRN
Status: DISCONTINUED | OUTPATIENT
Start: 2020-01-01 | End: 2020-01-01 | Stop reason: HOSPADM

## 2020-01-01 RX ORDER — FUROSEMIDE 10 MG/ML
40 INJECTION INTRAMUSCULAR; INTRAVENOUS ONCE
Status: COMPLETED | OUTPATIENT
Start: 2020-01-01 | End: 2020-01-01

## 2020-01-01 RX ORDER — MORPHINE SULFATE 2 MG/ML
2 INJECTION, SOLUTION INTRAMUSCULAR; INTRAVENOUS EVERY 6 HOURS PRN
Status: DISCONTINUED | OUTPATIENT
Start: 2020-01-01 | End: 2020-01-01

## 2020-01-01 RX ORDER — SILDENAFIL CITRATE 20 MG/1
20 TABLET ORAL 3 TIMES DAILY
Status: DISCONTINUED | OUTPATIENT
Start: 2020-01-01 | End: 2020-01-01 | Stop reason: HOSPADM

## 2020-01-01 RX ORDER — PANTOPRAZOLE SODIUM 40 MG/1
40 TABLET, DELAYED RELEASE ORAL
Status: DISCONTINUED | OUTPATIENT
Start: 2020-01-01 | End: 2020-01-01 | Stop reason: HOSPADM

## 2020-01-01 RX ORDER — MAGNESIUM SULFATE IN WATER 40 MG/ML
4 INJECTION, SOLUTION INTRAVENOUS ONCE
Status: COMPLETED | OUTPATIENT
Start: 2020-01-01 | End: 2020-01-01

## 2020-01-01 RX ORDER — FUROSEMIDE 10 MG/ML
20 INJECTION INTRAMUSCULAR; INTRAVENOUS ONCE
Status: COMPLETED | OUTPATIENT
Start: 2020-01-01 | End: 2020-01-01

## 2020-01-01 RX ORDER — 0.9 % SODIUM CHLORIDE 0.9 %
30 INTRAVENOUS SOLUTION INTRAVENOUS ONCE
Status: COMPLETED | OUTPATIENT
Start: 2020-01-01 | End: 2020-01-01

## 2020-01-01 RX ORDER — ASPIRIN 81 MG/1
81 TABLET, CHEWABLE ORAL DAILY
Status: DISCONTINUED | OUTPATIENT
Start: 2020-01-01 | End: 2020-01-01 | Stop reason: HOSPADM

## 2020-01-01 RX ORDER — MIDODRINE HYDROCHLORIDE 10 MG/1
10 TABLET ORAL 3 TIMES DAILY
Status: DISCONTINUED | OUTPATIENT
Start: 2020-01-01 | End: 2020-01-01

## 2020-01-01 RX ORDER — OMEPRAZOLE 40 MG/1
40 CAPSULE, DELAYED RELEASE ORAL 2 TIMES DAILY
COMMUNITY

## 2020-01-01 RX ORDER — M-VIT,TX,IRON,MINS/CALC/FOLIC 27MG-0.4MG
1 TABLET ORAL DAILY
Status: DISCONTINUED | OUTPATIENT
Start: 2020-01-01 | End: 2020-01-01 | Stop reason: HOSPADM

## 2020-01-01 RX ORDER — ALLOPURINOL 100 MG/1
200 TABLET ORAL DAILY
Status: DISCONTINUED | OUTPATIENT
Start: 2020-01-01 | End: 2020-01-01

## 2020-01-01 RX ADMIN — FERROUS SULFATE TAB EC 324 MG (65 MG FE EQUIVALENT) 324 MG: 324 (65 FE) TABLET DELAYED RESPONSE at 17:00

## 2020-01-01 RX ADMIN — CEFEPIME HYDROCHLORIDE 2 G: 2 INJECTION, POWDER, FOR SOLUTION INTRAVENOUS at 12:45

## 2020-01-01 RX ADMIN — LINEZOLID 600 MG: 600 INJECTION, SOLUTION INTRAVENOUS at 12:55

## 2020-01-01 RX ADMIN — SILDENAFIL 20 MG: 20 TABLET ORAL at 08:55

## 2020-01-01 RX ADMIN — SODIUM CHLORIDE, PRESERVATIVE FREE 10 ML: 5 INJECTION INTRAVENOUS at 21:54

## 2020-01-01 RX ADMIN — PANTOPRAZOLE SODIUM 40 MG: 40 TABLET, DELAYED RELEASE ORAL at 08:35

## 2020-01-01 RX ADMIN — ASPIRIN 81 MG: 81 TABLET, CHEWABLE ORAL at 08:21

## 2020-01-01 RX ADMIN — MORPHINE SULFATE 2 MG: 2 INJECTION, SOLUTION INTRAMUSCULAR; INTRAVENOUS at 15:52

## 2020-01-01 RX ADMIN — OXYCODONE HYDROCHLORIDE AND ACETAMINOPHEN 1 TABLET: 5; 325 TABLET ORAL at 12:45

## 2020-01-01 RX ADMIN — MORPHINE SULFATE 2 MG: 2 INJECTION, SOLUTION INTRAMUSCULAR; INTRAVENOUS at 20:36

## 2020-01-01 RX ADMIN — BISACODYL 10 MG: 10 SUPPOSITORY RECTAL at 20:36

## 2020-01-01 RX ADMIN — CEFEPIME HYDROCHLORIDE 2 G: 2 INJECTION, POWDER, FOR SOLUTION INTRAVENOUS at 23:35

## 2020-01-01 RX ADMIN — FERROUS SULFATE TAB EC 324 MG (65 MG FE EQUIVALENT) 324 MG: 324 (65 FE) TABLET DELAYED RESPONSE at 08:21

## 2020-01-01 RX ADMIN — Medication 10 ML: at 20:04

## 2020-01-01 RX ADMIN — NYSTATIN OINTMENT: 100000 OINTMENT TOPICAL at 20:04

## 2020-01-01 RX ADMIN — OXYCODONE HYDROCHLORIDE AND ACETAMINOPHEN 1 TABLET: 5; 325 TABLET ORAL at 20:04

## 2020-01-01 RX ADMIN — ENOXAPARIN SODIUM 40 MG: 40 INJECTION SUBCUTANEOUS at 20:35

## 2020-01-01 RX ADMIN — FERROUS SULFATE TAB EC 324 MG (65 MG FE EQUIVALENT) 324 MG: 324 (65 FE) TABLET DELAYED RESPONSE at 08:53

## 2020-01-01 RX ADMIN — LINEZOLID 600 MG: 600 INJECTION, SOLUTION INTRAVENOUS at 13:14

## 2020-01-01 RX ADMIN — PANTOPRAZOLE SODIUM 40 MG: 40 TABLET, DELAYED RELEASE ORAL at 05:27

## 2020-01-01 RX ADMIN — OXYCODONE HYDROCHLORIDE AND ACETAMINOPHEN 1 TABLET: 5; 325 TABLET ORAL at 10:14

## 2020-01-01 RX ADMIN — MULTIPLE VITAMINS W/ MINERALS TAB 1 TABLET: TAB at 08:53

## 2020-01-01 RX ADMIN — OXYCODONE HYDROCHLORIDE AND ACETAMINOPHEN 1 TABLET: 5; 325 TABLET ORAL at 16:58

## 2020-01-01 RX ADMIN — MULTIPLE VITAMINS W/ MINERALS TAB 1 TABLET: TAB at 11:05

## 2020-01-01 RX ADMIN — MIDODRINE HYDROCHLORIDE 10 MG: 10 TABLET ORAL at 21:53

## 2020-01-01 RX ADMIN — ENOXAPARIN SODIUM 40 MG: 40 INJECTION SUBCUTANEOUS at 21:54

## 2020-01-01 RX ADMIN — SILDENAFIL 20 MG: 20 TABLET ORAL at 20:36

## 2020-01-01 RX ADMIN — PANTOPRAZOLE SODIUM 40 MG: 40 TABLET, DELAYED RELEASE ORAL at 16:40

## 2020-01-01 RX ADMIN — OXYCODONE HYDROCHLORIDE AND ACETAMINOPHEN 1 TABLET: 5; 325 TABLET ORAL at 01:11

## 2020-01-01 RX ADMIN — SILDENAFIL 20 MG: 20 TABLET ORAL at 10:01

## 2020-01-01 RX ADMIN — FUROSEMIDE 5 MG/HR: 10 INJECTION, SOLUTION INTRAMUSCULAR; INTRAVENOUS at 15:35

## 2020-01-01 RX ADMIN — OXYCODONE HYDROCHLORIDE AND ACETAMINOPHEN 1 TABLET: 5; 325 TABLET ORAL at 16:49

## 2020-01-01 RX ADMIN — MORPHINE SULFATE 2 MG: 2 INJECTION, SOLUTION INTRAMUSCULAR; INTRAVENOUS at 11:34

## 2020-01-01 RX ADMIN — OXYCODONE HYDROCHLORIDE AND ACETAMINOPHEN 1 TABLET: 5; 325 TABLET ORAL at 18:15

## 2020-01-01 RX ADMIN — MIDODRINE HYDROCHLORIDE 10 MG: 10 TABLET ORAL at 13:43

## 2020-01-01 RX ADMIN — OXYCODONE HYDROCHLORIDE AND ACETAMINOPHEN 1 TABLET: 5; 325 TABLET ORAL at 21:53

## 2020-01-01 RX ADMIN — CALCIUM GLUCONATE 1 G: 20 INJECTION, SOLUTION INTRAVENOUS at 17:26

## 2020-01-01 RX ADMIN — SILDENAFIL 20 MG: 20 TABLET ORAL at 08:21

## 2020-01-01 RX ADMIN — SILDENAFIL 20 MG: 20 TABLET ORAL at 08:47

## 2020-01-01 RX ADMIN — MAGNESIUM SULFATE HEPTAHYDRATE 2 G: 40 INJECTION, SOLUTION INTRAVENOUS at 17:26

## 2020-01-01 RX ADMIN — FERROUS SULFATE TAB EC 324 MG (65 MG FE EQUIVALENT) 324 MG: 324 (65 FE) TABLET DELAYED RESPONSE at 16:58

## 2020-01-01 RX ADMIN — SILDENAFIL 20 MG: 20 TABLET ORAL at 16:58

## 2020-01-01 RX ADMIN — FUROSEMIDE 20 MG: 10 INJECTION, SOLUTION INTRAMUSCULAR; INTRAVENOUS at 17:02

## 2020-01-01 RX ADMIN — MORPHINE SULFATE 1 MG: 2 INJECTION, SOLUTION INTRAMUSCULAR; INTRAVENOUS at 21:22

## 2020-01-01 RX ADMIN — MULTIPLE VITAMINS W/ MINERALS TAB 1 TABLET: TAB at 08:35

## 2020-01-01 RX ADMIN — SILDENAFIL 20 MG: 20 TABLET ORAL at 13:45

## 2020-01-01 RX ADMIN — PANTOPRAZOLE SODIUM 40 MG: 40 TABLET, DELAYED RELEASE ORAL at 05:20

## 2020-01-01 RX ADMIN — CEFEPIME HYDROCHLORIDE 2 G: 2 INJECTION, POWDER, FOR SOLUTION INTRAVENOUS at 11:38

## 2020-01-01 RX ADMIN — HEPARIN SODIUM 4800 UNITS: 1000 INJECTION INTRAVENOUS; SUBCUTANEOUS at 18:19

## 2020-01-01 RX ADMIN — MIDODRINE HYDROCHLORIDE 10 MG: 10 TABLET ORAL at 16:40

## 2020-01-01 RX ADMIN — HYDROMORPHONE HYDROCHLORIDE 0.5 MG: 1 INJECTION, SOLUTION INTRAMUSCULAR; INTRAVENOUS; SUBCUTANEOUS at 23:56

## 2020-01-01 RX ADMIN — CALCIUM GLUCONATE 2 G: 94 INJECTION, SOLUTION INTRAVENOUS at 20:48

## 2020-01-01 RX ADMIN — FERROUS SULFATE TAB EC 324 MG (65 MG FE EQUIVALENT) 324 MG: 324 (65 FE) TABLET DELAYED RESPONSE at 12:52

## 2020-01-01 RX ADMIN — Medication 400 MG: at 08:04

## 2020-01-01 RX ADMIN — MIDODRINE HYDROCHLORIDE 10 MG: 10 TABLET ORAL at 09:57

## 2020-01-01 RX ADMIN — Medication 400 MG: at 09:57

## 2020-01-01 RX ADMIN — CEFEPIME HYDROCHLORIDE 2 G: 2 INJECTION, POWDER, FOR SOLUTION INTRAVENOUS at 11:06

## 2020-01-01 RX ADMIN — VANCOMYCIN HYDROCHLORIDE 1000 MG: 1 INJECTION, POWDER, LYOPHILIZED, FOR SOLUTION INTRAVENOUS at 09:57

## 2020-01-01 RX ADMIN — OXYCODONE HYDROCHLORIDE AND ACETAMINOPHEN 1 TABLET: 5; 325 TABLET ORAL at 01:06

## 2020-01-01 RX ADMIN — MIDODRINE HYDROCHLORIDE 10 MG: 10 TABLET ORAL at 12:55

## 2020-01-01 RX ADMIN — VANCOMYCIN HYDROCHLORIDE 1000 MG: 1 INJECTION, POWDER, LYOPHILIZED, FOR SOLUTION INTRAVENOUS at 17:11

## 2020-01-01 RX ADMIN — LINEZOLID 600 MG: 600 INJECTION, SOLUTION INTRAVENOUS at 23:56

## 2020-01-01 RX ADMIN — MORPHINE SULFATE 2 MG: 2 INJECTION, SOLUTION INTRAMUSCULAR; INTRAVENOUS at 23:34

## 2020-01-01 RX ADMIN — MIDODRINE HYDROCHLORIDE 10 MG: 10 TABLET ORAL at 08:04

## 2020-01-01 RX ADMIN — SILDENAFIL 20 MG: 20 TABLET ORAL at 11:11

## 2020-01-01 RX ADMIN — FERROUS SULFATE TAB EC 324 MG (65 MG FE EQUIVALENT) 324 MG: 324 (65 FE) TABLET DELAYED RESPONSE at 11:05

## 2020-01-01 RX ADMIN — PANTOPRAZOLE SODIUM 40 MG: 40 TABLET, DELAYED RELEASE ORAL at 17:10

## 2020-01-01 RX ADMIN — SILDENAFIL 20 MG: 20 TABLET ORAL at 21:23

## 2020-01-01 RX ADMIN — FUROSEMIDE 20 MG: 10 INJECTION, SOLUTION INTRAMUSCULAR; INTRAVENOUS at 11:11

## 2020-01-01 RX ADMIN — NYSTATIN OINTMENT: 100000 OINTMENT TOPICAL at 08:53

## 2020-01-01 RX ADMIN — NYSTATIN OINTMENT: 100000 OINTMENT TOPICAL at 10:04

## 2020-01-01 RX ADMIN — FUROSEMIDE 40 MG: 10 INJECTION, SOLUTION INTRAMUSCULAR; INTRAVENOUS at 10:14

## 2020-01-01 RX ADMIN — MORPHINE SULFATE 2 MG: 2 INJECTION, SOLUTION INTRAMUSCULAR; INTRAVENOUS at 09:57

## 2020-01-01 RX ADMIN — NYSTATIN OINTMENT: 100000 OINTMENT TOPICAL at 13:18

## 2020-01-01 RX ADMIN — MIDODRINE HYDROCHLORIDE 10 MG: 10 TABLET ORAL at 21:23

## 2020-01-01 RX ADMIN — Medication 10 ML: at 20:01

## 2020-01-01 RX ADMIN — MIDODRINE HYDROCHLORIDE 10 MG: 10 TABLET ORAL at 14:31

## 2020-01-01 RX ADMIN — FERROUS SULFATE TAB EC 324 MG (65 MG FE EQUIVALENT) 324 MG: 324 (65 FE) TABLET DELAYED RESPONSE at 11:38

## 2020-01-01 RX ADMIN — MIDODRINE HYDROCHLORIDE 10 MG: 10 TABLET ORAL at 12:11

## 2020-01-01 RX ADMIN — MAGNESIUM SULFATE HEPTAHYDRATE 4 G: 40 INJECTION, SOLUTION INTRAVENOUS at 09:57

## 2020-01-01 RX ADMIN — PANTOPRAZOLE SODIUM 40 MG: 40 TABLET, DELAYED RELEASE ORAL at 05:03

## 2020-01-01 RX ADMIN — IOHEXOL 50 ML: 240 INJECTION, SOLUTION INTRATHECAL; INTRAVASCULAR; INTRAVENOUS; ORAL at 10:35

## 2020-01-01 RX ADMIN — SODIUM CHLORIDE, PRESERVATIVE FREE 10 ML: 5 INJECTION INTRAVENOUS at 09:57

## 2020-01-01 RX ADMIN — CEFEPIME HYDROCHLORIDE 2 G: 2 INJECTION, POWDER, FOR SOLUTION INTRAVENOUS at 23:31

## 2020-01-01 RX ADMIN — POLYETHYLENE GLYCOL 3350 17 G: 17 POWDER, FOR SOLUTION ORAL at 08:04

## 2020-01-01 RX ADMIN — FERROUS SULFATE TAB EC 324 MG (65 MG FE EQUIVALENT) 324 MG: 324 (65 FE) TABLET DELAYED RESPONSE at 08:04

## 2020-01-01 RX ADMIN — Medication 10 ML: at 08:32

## 2020-01-01 RX ADMIN — VANCOMYCIN HYDROCHLORIDE 1000 MG: 1 INJECTION, POWDER, LYOPHILIZED, FOR SOLUTION INTRAVENOUS at 12:52

## 2020-01-01 RX ADMIN — SILDENAFIL 20 MG: 20 TABLET ORAL at 20:04

## 2020-01-01 RX ADMIN — ASPIRIN 81 MG: 81 TABLET, CHEWABLE ORAL at 08:04

## 2020-01-01 RX ADMIN — SODIUM CHLORIDE, PRESERVATIVE FREE 10 ML: 5 INJECTION INTRAVENOUS at 11:06

## 2020-01-01 RX ADMIN — HYDROXYZINE HYDROCHLORIDE 25 MG: 25 TABLET, FILM COATED ORAL at 17:00

## 2020-01-01 RX ADMIN — MULTIPLE VITAMINS W/ MINERALS TAB 1 TABLET: TAB at 08:21

## 2020-01-01 RX ADMIN — ACETAMINOPHEN 650 MG: 325 TABLET ORAL at 23:31

## 2020-01-01 RX ADMIN — MIDODRINE HYDROCHLORIDE 10 MG: 10 TABLET ORAL at 11:05

## 2020-01-01 RX ADMIN — ASPIRIN 81 MG: 81 TABLET, CHEWABLE ORAL at 08:53

## 2020-01-01 RX ADMIN — SILDENAFIL 20 MG: 20 TABLET ORAL at 20:01

## 2020-01-01 RX ADMIN — ENOXAPARIN SODIUM 40 MG: 40 INJECTION SUBCUTANEOUS at 08:52

## 2020-01-01 RX ADMIN — MIDODRINE HYDROCHLORIDE 10 MG: 10 TABLET ORAL at 08:53

## 2020-01-01 RX ADMIN — FERROUS SULFATE TAB EC 324 MG (65 MG FE EQUIVALENT) 324 MG: 324 (65 FE) TABLET DELAYED RESPONSE at 17:10

## 2020-01-01 RX ADMIN — HEPARIN SODIUM 10.7 ML/HR: 10000 INJECTION, SOLUTION INTRAVENOUS at 18:19

## 2020-01-01 RX ADMIN — FERROUS SULFATE TAB EC 324 MG (65 MG FE EQUIVALENT) 324 MG: 324 (65 FE) TABLET DELAYED RESPONSE at 09:57

## 2020-01-01 RX ADMIN — CALCIUM GLUCONATE 1 G: 20 INJECTION, SOLUTION INTRAVENOUS at 09:57

## 2020-01-01 RX ADMIN — Medication 400 MG: at 08:21

## 2020-01-01 RX ADMIN — PANTOPRAZOLE SODIUM 40 MG: 40 TABLET, DELAYED RELEASE ORAL at 05:11

## 2020-01-01 RX ADMIN — Medication 400 MG: at 11:07

## 2020-01-01 RX ADMIN — MIDODRINE HYDROCHLORIDE 10 MG: 10 TABLET ORAL at 16:57

## 2020-01-01 RX ADMIN — HYDROMORPHONE HYDROCHLORIDE 0.5 MG: 1 INJECTION, SOLUTION INTRAMUSCULAR; INTRAVENOUS; SUBCUTANEOUS at 10:02

## 2020-01-01 RX ADMIN — FERROUS SULFATE TAB EC 324 MG (65 MG FE EQUIVALENT) 324 MG: 324 (65 FE) TABLET DELAYED RESPONSE at 17:26

## 2020-01-01 RX ADMIN — PANTOPRAZOLE SODIUM 40 MG: 40 TABLET, DELAYED RELEASE ORAL at 17:26

## 2020-01-01 RX ADMIN — MIDODRINE HYDROCHLORIDE 10 MG: 10 TABLET ORAL at 17:10

## 2020-01-01 RX ADMIN — CEFEPIME HYDROCHLORIDE 2 G: 2 INJECTION, POWDER, FOR SOLUTION INTRAVENOUS at 23:55

## 2020-01-01 RX ADMIN — SODIUM CHLORIDE: 9 INJECTION, SOLUTION INTRAVENOUS at 16:49

## 2020-01-01 RX ADMIN — PANTOPRAZOLE SODIUM 40 MG: 40 TABLET, DELAYED RELEASE ORAL at 17:00

## 2020-01-01 RX ADMIN — ALLOPURINOL 200 MG: 100 TABLET ORAL at 09:57

## 2020-01-01 RX ADMIN — IPRATROPIUM BROMIDE AND ALBUTEROL SULFATE 1 AMPULE: .5; 3 SOLUTION RESPIRATORY (INHALATION) at 01:23

## 2020-01-01 RX ADMIN — FERROUS SULFATE TAB EC 324 MG (65 MG FE EQUIVALENT) 324 MG: 324 (65 FE) TABLET DELAYED RESPONSE at 12:45

## 2020-01-01 RX ADMIN — PANTOPRAZOLE SODIUM 40 MG: 40 TABLET, DELAYED RELEASE ORAL at 16:49

## 2020-01-01 RX ADMIN — MIDODRINE HYDROCHLORIDE 10 MG: 10 TABLET ORAL at 08:21

## 2020-01-01 RX ADMIN — HYDROMORPHONE HYDROCHLORIDE 0.5 MG: 1 INJECTION, SOLUTION INTRAMUSCULAR; INTRAVENOUS; SUBCUTANEOUS at 13:14

## 2020-01-01 RX ADMIN — MIDODRINE HYDROCHLORIDE 10 MG: 10 TABLET ORAL at 17:00

## 2020-01-01 RX ADMIN — ACETAMINOPHEN 650 MG: 325 TABLET ORAL at 21:22

## 2020-01-01 RX ADMIN — FERROUS SULFATE TAB EC 324 MG (65 MG FE EQUIVALENT) 324 MG: 324 (65 FE) TABLET DELAYED RESPONSE at 16:49

## 2020-01-01 RX ADMIN — CEFEPIME HYDROCHLORIDE 1 G: 1 INJECTION, POWDER, FOR SOLUTION INTRAMUSCULAR; INTRAVENOUS at 23:56

## 2020-01-01 RX ADMIN — SILDENAFIL 20 MG: 20 TABLET ORAL at 16:40

## 2020-01-01 RX ADMIN — ASPIRIN 81 MG: 81 TABLET, CHEWABLE ORAL at 08:35

## 2020-01-01 RX ADMIN — PANTOPRAZOLE SODIUM 40 MG: 40 TABLET, DELAYED RELEASE ORAL at 05:54

## 2020-01-01 RX ADMIN — FERROUS SULFATE TAB EC 324 MG (65 MG FE EQUIVALENT) 324 MG: 324 (65 FE) TABLET DELAYED RESPONSE at 12:11

## 2020-01-01 RX ADMIN — POLYETHYLENE GLYCOL 3350 17 G: 17 POWDER, FOR SOLUTION ORAL at 08:52

## 2020-01-01 RX ADMIN — CEFEPIME HYDROCHLORIDE 2 G: 2 INJECTION, POWDER, FOR SOLUTION INTRAVENOUS at 12:14

## 2020-01-01 RX ADMIN — LINEZOLID 600 MG: 600 INJECTION, SOLUTION INTRAVENOUS at 12:08

## 2020-01-01 RX ADMIN — SODIUM CHLORIDE, PRESERVATIVE FREE 10 ML: 5 INJECTION INTRAVENOUS at 20:37

## 2020-01-01 RX ADMIN — FERROUS SULFATE TAB EC 324 MG (65 MG FE EQUIVALENT) 324 MG: 324 (65 FE) TABLET DELAYED RESPONSE at 12:55

## 2020-01-01 RX ADMIN — SODIUM CHLORIDE, PRESERVATIVE FREE 10 ML: 5 INJECTION INTRAVENOUS at 08:40

## 2020-01-01 RX ADMIN — FUROSEMIDE 40 MG: 10 INJECTION, SOLUTION INTRAMUSCULAR; INTRAVENOUS at 08:21

## 2020-01-01 RX ADMIN — OXYCODONE HYDROCHLORIDE AND ACETAMINOPHEN 1 TABLET: 5; 325 TABLET ORAL at 19:12

## 2020-01-01 RX ADMIN — MIDODRINE HYDROCHLORIDE 10 MG: 10 TABLET ORAL at 20:36

## 2020-01-01 RX ADMIN — Medication 10 ML: at 10:04

## 2020-01-01 RX ADMIN — CEFEPIME HYDROCHLORIDE 1 G: 1 INJECTION, POWDER, FOR SOLUTION INTRAMUSCULAR; INTRAVENOUS at 11:43

## 2020-01-01 RX ADMIN — CEFEPIME HYDROCHLORIDE 1 G: 1 INJECTION, POWDER, FOR SOLUTION INTRAMUSCULAR; INTRAVENOUS at 23:34

## 2020-01-01 RX ADMIN — MIDODRINE HYDROCHLORIDE 10 MG: 10 TABLET ORAL at 08:39

## 2020-01-01 RX ADMIN — MULTIPLE VITAMINS W/ MINERALS TAB 1 TABLET: TAB at 08:04

## 2020-01-01 RX ADMIN — MORPHINE SULFATE 2 MG: 2 INJECTION, SOLUTION INTRAMUSCULAR; INTRAVENOUS at 04:22

## 2020-01-01 RX ADMIN — PANTOPRAZOLE SODIUM 40 MG: 40 TABLET, DELAYED RELEASE ORAL at 16:58

## 2020-01-01 RX ADMIN — CEFEPIME HYDROCHLORIDE 1 G: 1 INJECTION, POWDER, FOR SOLUTION INTRAMUSCULAR; INTRAVENOUS at 12:12

## 2020-01-01 RX ADMIN — MORPHINE SULFATE 2 MG: 2 INJECTION, SOLUTION INTRAMUSCULAR; INTRAVENOUS at 05:54

## 2020-01-01 RX ADMIN — Medication 400 MG: at 08:53

## 2020-01-01 RX ADMIN — ASPIRIN 81 MG: 81 TABLET, CHEWABLE ORAL at 09:57

## 2020-01-01 RX ADMIN — CEFEPIME HYDROCHLORIDE 2 G: 2 INJECTION, POWDER, FOR SOLUTION INTRAVENOUS at 00:24

## 2020-01-01 RX ADMIN — SILDENAFIL 20 MG: 20 TABLET ORAL at 14:31

## 2020-01-01 RX ADMIN — NYSTATIN OINTMENT: 100000 OINTMENT TOPICAL at 20:01

## 2020-01-01 RX ADMIN — MULTIPLE VITAMINS W/ MINERALS TAB 1 TABLET: TAB at 09:57

## 2020-01-01 RX ADMIN — FERROUS SULFATE TAB EC 324 MG (65 MG FE EQUIVALENT) 324 MG: 324 (65 FE) TABLET DELAYED RESPONSE at 08:35

## 2020-01-01 RX ADMIN — ENOXAPARIN SODIUM 40 MG: 40 INJECTION SUBCUTANEOUS at 12:56

## 2020-01-01 RX ADMIN — FERROUS SULFATE TAB EC 324 MG (65 MG FE EQUIVALENT) 324 MG: 324 (65 FE) TABLET DELAYED RESPONSE at 16:40

## 2020-01-01 RX ADMIN — SILDENAFIL 20 MG: 20 TABLET ORAL at 09:57

## 2020-01-01 RX ADMIN — FUROSEMIDE 20 MG: 10 INJECTION, SOLUTION INTRAMUSCULAR; INTRAVENOUS at 22:06

## 2020-01-01 RX ADMIN — POLYETHYLENE GLYCOL 3350 17 G: 17 POWDER, FOR SOLUTION ORAL at 17:18

## 2020-01-01 RX ADMIN — SILDENAFIL 20 MG: 20 TABLET ORAL at 21:54

## 2020-01-01 RX ADMIN — FUROSEMIDE 40 MG: 10 INJECTION, SOLUTION INTRAMUSCULAR; INTRAVENOUS at 01:41

## 2020-01-01 RX ADMIN — SODIUM CHLORIDE 2226 ML: 9 INJECTION, SOLUTION INTRAVENOUS at 11:24

## 2020-01-01 RX ADMIN — SILDENAFIL 20 MG: 20 TABLET ORAL at 22:06

## 2020-01-01 RX ADMIN — OXYCODONE HYDROCHLORIDE AND ACETAMINOPHEN 1 TABLET: 5; 325 TABLET ORAL at 00:24

## 2020-01-01 RX ADMIN — FUROSEMIDE 20 MG: 10 INJECTION, SOLUTION INTRAMUSCULAR; INTRAVENOUS at 15:22

## 2020-01-01 RX ADMIN — OXYCODONE HYDROCHLORIDE AND ACETAMINOPHEN 1 TABLET: 5; 325 TABLET ORAL at 12:11

## 2020-01-01 RX ADMIN — MIDODRINE HYDROCHLORIDE 10 MG: 10 TABLET ORAL at 16:49

## 2020-01-01 RX ADMIN — MORPHINE SULFATE 2 MG: 2 INJECTION, SOLUTION INTRAMUSCULAR; INTRAVENOUS at 12:48

## 2020-01-01 RX ADMIN — Medication 400 MG: at 08:35

## 2020-01-01 RX ADMIN — LINEZOLID 600 MG: 600 INJECTION, SOLUTION INTRAVENOUS at 00:08

## 2020-01-01 RX ADMIN — ASPIRIN 81 MG: 81 TABLET, CHEWABLE ORAL at 11:05

## 2020-01-01 RX ADMIN — SILDENAFIL 20 MG: 20 TABLET ORAL at 14:04

## 2020-01-01 ASSESSMENT — PAIN DESCRIPTION - PAIN TYPE
TYPE: ACUTE PAIN
TYPE: CHRONIC PAIN
TYPE: ACUTE PAIN
TYPE: CHRONIC PAIN;ACUTE PAIN
TYPE: ACUTE PAIN
TYPE: ACUTE PAIN

## 2020-01-01 ASSESSMENT — PAIN DESCRIPTION - ONSET
ONSET: ON-GOING

## 2020-01-01 ASSESSMENT — PAIN DESCRIPTION - DESCRIPTORS
DESCRIPTORS: THROBBING
DESCRIPTORS: SHARP
DESCRIPTORS: SHARP;SHOOTING
DESCRIPTORS: SHARP;SHOOTING
DESCRIPTORS: THROBBING
DESCRIPTORS: ACHING
DESCRIPTORS: THROBBING
DESCRIPTORS: SHARP;SHOOTING
DESCRIPTORS: ACHING;CRAMPING
DESCRIPTORS: SHARP;SHOOTING
DESCRIPTORS: ACHING
DESCRIPTORS: SHARP;SHOOTING
DESCRIPTORS: ACHING;CRAMPING
DESCRIPTORS: ACHING

## 2020-01-01 ASSESSMENT — PAIN DESCRIPTION - FREQUENCY
FREQUENCY: CONTINUOUS
FREQUENCY: INTERMITTENT
FREQUENCY: CONTINUOUS
FREQUENCY: INTERMITTENT
FREQUENCY: CONTINUOUS

## 2020-01-01 ASSESSMENT — PAIN DESCRIPTION - LOCATION
LOCATION: LEG
LOCATION: LEG
LOCATION: GENERALIZED;LEG
LOCATION: LEG
LOCATION: LEG;GENERALIZED
LOCATION: LEG

## 2020-01-01 ASSESSMENT — PAIN DESCRIPTION - PROGRESSION
CLINICAL_PROGRESSION: NOT CHANGED
CLINICAL_PROGRESSION: NOT CHANGED
CLINICAL_PROGRESSION: GRADUALLY WORSENING
CLINICAL_PROGRESSION: GRADUALLY WORSENING
CLINICAL_PROGRESSION: NOT CHANGED
CLINICAL_PROGRESSION: GRADUALLY WORSENING
CLINICAL_PROGRESSION: NOT CHANGED

## 2020-01-01 ASSESSMENT — PAIN DESCRIPTION - ORIENTATION
ORIENTATION: RIGHT

## 2020-01-01 ASSESSMENT — PAIN SCALES - GENERAL
PAINLEVEL_OUTOF10: 2
PAINLEVEL_OUTOF10: 7
PAINLEVEL_OUTOF10: 8
PAINLEVEL_OUTOF10: 10
PAINLEVEL_OUTOF10: 8
PAINLEVEL_OUTOF10: 0
PAINLEVEL_OUTOF10: 8
PAINLEVEL_OUTOF10: 8
PAINLEVEL_OUTOF10: 0
PAINLEVEL_OUTOF10: 8
PAINLEVEL_OUTOF10: 5
PAINLEVEL_OUTOF10: 10
PAINLEVEL_OUTOF10: 6
PAINLEVEL_OUTOF10: 10
PAINLEVEL_OUTOF10: 0
PAINLEVEL_OUTOF10: 0
PAINLEVEL_OUTOF10: 4
PAINLEVEL_OUTOF10: 4
PAINLEVEL_OUTOF10: 9
PAINLEVEL_OUTOF10: 10
PAINLEVEL_OUTOF10: 7
PAINLEVEL_OUTOF10: 6
PAINLEVEL_OUTOF10: 8
PAINLEVEL_OUTOF10: 0
PAINLEVEL_OUTOF10: 7
PAINLEVEL_OUTOF10: 10
PAINLEVEL_OUTOF10: 9
PAINLEVEL_OUTOF10: 6
PAINLEVEL_OUTOF10: 0

## 2020-01-01 ASSESSMENT — PAIN - FUNCTIONAL ASSESSMENT
PAIN_FUNCTIONAL_ASSESSMENT: PREVENTS OR INTERFERES WITH MANY ACTIVE NOT PASSIVE ACTIVITIES
PAIN_FUNCTIONAL_ASSESSMENT: PREVENTS OR INTERFERES WITH MANY ACTIVE NOT PASSIVE ACTIVITIES
PAIN_FUNCTIONAL_ASSESSMENT: PREVENTS OR INTERFERES SOME ACTIVE ACTIVITIES AND ADLS

## 2020-01-01 ASSESSMENT — ENCOUNTER SYMPTOMS
SORE THROAT: 0
BACK PAIN: 0
NAUSEA: 0
VOMITING: 0
ABDOMINAL PAIN: 0
SHORTNESS OF BREATH: 0

## 2020-12-24 PROBLEM — L03.90 CELLULITIS: Status: ACTIVE | Noted: 2020-01-01

## 2020-12-24 NOTE — CONSULTS
Clinical Pharmacy Note  Vancomycin Consult    Edinson Fernández is a 64 y.o. female ordered Vancomycin for sepsis secondary to SSSI; consult received from Dr. Adal Good to manage therapy. Also receiving Cefepime. Patient Active Problem List   Diagnosis    Osteomyelitis of foot, left, acute (HCC)    Cellulitis and abscess of foot    Chest pain    Acute on chronic respiratory failure (HCC)    CREST syndrome (HCC)    Pulmonary hypertension ([address]    PAH (pulmonary artery hypertension) ([facility]    Tobacco use    Cellulitis       Allergies:  Patient has no known allergies. Temp max:  Temp (24hrs), Av.9 °F (37.2 °C), Min:98.6 °F (37 °C), Max:99.3 °F (37.4 °C)      Recent Labs     20  1010   WBC 12.1*       Recent Labs     20  1010   BUN 36*   CREATININE 1.8*       No intake or output data in the 24 hours ending 20 1539    Culture Results:  pending    Ht Readings from Last 1 Encounters:   20 5' 3\" (1.6 m)        Wt Readings from Last 1 Encounters:   20 163 lb 9.3 oz (74.2 kg)         Estimated Creatinine Clearance: 33 mL/min (A) (based on SCr of 1.8 mg/dL (H)). Assessment/Plan:  Vancomycin 1,000 mg IVPB x 1 given in ER. Will obtain a random Vancomycin level on 20 with AM labs to assist with subsequent dosing/management. Thank you for the consult. Will continue to follow.     Kelechi Naylor, HerberD, BCPS  2020 3:40 PM

## 2020-12-24 NOTE — ED NOTES
Pharmacy Medication Reconciliation Note     List of medications patient is currently taking is complete. Source of information:   1. ECF list  2. Nurse from facility    Notes regarding home medications:   1. Reports she did receive her AM medications today, including a dose of percocet  2. Given second dose of midodrine in ED.        Isaias Bah PharmD, BCPS  12/24/2020  12:29 PM

## 2020-12-24 NOTE — PROGRESS NOTES
Physical Therapy  Attempt Note    Name:Patricia Smith  :1960  WWT:[id number]  Room: Q8Z-1244/3123-01    Date of Service: 2020     PT orders received/acknowledged, Patient chart reviewed. PT attempted to see for PT eval/tx at this time. Pt off unit for CT imaging at this time for R leg, hold this date. Will attempt again as therapy schedule permits tomorrow.                   Electronically signed by Sarahi Espinoza PT on 2020 at 4:35 PM

## 2020-12-24 NOTE — PROGRESS NOTES
4 Eyes Admission Assessment     I agree as the admission nurse that 2 RN's have performed a thorough Head to Toe Skin Assessment on the patient. ALL assessment sites listed below have been assessed on admission. Areas assessed by both nurses:   [x]   Head, Face, and Ears   [x]   Shoulders, Back, and Chest  [x]   Arms, Elbows, and Hands   [x]   Coccyx, Sacrum, and Ischum  [x]   Legs, Feet, and Heels         Does the Patient have Skin Breakdown?   No - blanchable redness to sacrum, redness to RLE        Hemanth Prevention initiated:  Yes   Wound Care Orders initiated:  NA      [facility] nurse consulted for Pressure Injury (Stage 3,4, Unstageable, DTI, NWPT, and Complex wounds):  NA      Nurse 1 eSignature: Electronically signed by Rosa Elena Lanier RN on 12/24/20 at 4:12 PM EST    **SHARE this note so that the co-signing nurse is able to place an eSignature**    Nurse 2 eSignature: Electronically signed by Nereida Mcclendon RN on 12/24/20 at 4:14 PM EST

## 2020-12-24 NOTE — ED PROVIDER NOTES
[address] [facility]      Pt Name: Jossue Pappas  MRN: [id number]  [place] 1960  Date of evaluation: 12/24/2020  Provider: Mell Chau PA-C    This patient was seen and evaluated by the attending physician Kishan Ruelas*. CHIEF COMPLAINT       Chief Complaint   Patient presents with    Leg Pain     right leg pain started over night was given percocet with no relief          HISTORYOF PRESENT ILLNESS  (Location/Symptom, Timing/Onset, Context/Setting, Quality, Duration, Modifying Factors, Severity.)   Jossue Pappas is a 64 y.o. female who presents to the emergency department with right leg pain. The patient reports pain developed overnight. Pain is constant. It worsens to the touch. No better despite home Percocet. Reports associated swelling, redness, warmth. Denies numbness or weakness. She is status post left foot amputation secondary to peripheral arterial disease. She denies fevers or chills or other complaints. Nursing Notes were reviewedand I agree. REVIEW OF SYSTEMS    (2-9 systems for level 4, 10 or more forlevel 5)     Review of Systems   Constitutional: Negative for chills and fever. HENT: Negative for sore throat. Respiratory: Negative for shortness of breath. Cardiovascular: Positive for leg swelling. Negative for chest pain. Gastrointestinal: Negative for abdominal pain, nausea and vomiting. Genitourinary: Negative for difficulty urinating and dysuria. Musculoskeletal: Positive for myalgias. Negative for back pain. Skin: Negative for rash. Neurological: Negative for light-headedness and headaches. Psychiatric/Behavioral: The patient is not nervous/anxious. All other systems reviewed and are negative. Except as noted above the remainder ofthe review of systems was reviewed and negative.        PAST MEDICALHISTORY         Diagnosis Date    Abnormal uterine bleeding  Cellulitis 2/2013    left foot    Hypertension     Peripheral vascular disease [facility]     Scleroderma ([facility]        SURGICAL HISTORY           Procedure Laterality Date    AMPUTATION  2/2013    left foot 5th toe    HYSTERECTOMY      total with both tubes and ovaries       CURRENT MEDICATIONS       Previous Medications    ACETAMINOPHEN (TYLENOL) 325 MG TABLET    Take 650 mg by mouth every 6 hours as needed for Pain or Fever     ALLOPURINOL (ZYLOPRIM) 100 MG TABLET    Take 200 mg by mouth daily    ASPIRIN 81 MG CHEWABLE TABLET    Take 81 mg by mouth daily     ATORVASTATIN (LIPITOR) 20 MG TABLET    Take 20 mg by mouth nightly    BISACODYL (DULCOLAX) 10 MG SUPPOSITORY    Place 10 mg rectally daily as needed for Constipation     FERROUS SULFATE (FE TABS 325) 325 (65 FE) MG EC TABLET    Take 325 mg by mouth 3 times daily (with meals)    HYDROXYZINE (ATARAX) 25 MG TABLET    Take 25 mg by mouth every 8 hours as needed for Anxiety     MACITENTAN 10 MG TABS    Take 10 mg by mouth daily    MAGNESIUM HYDROXIDE (MILK OF MAGNESIA) 400 MG/5ML SUSPENSION    Take by mouth daily as needed for Constipation    MAGNESIUM OXIDE (MAG-OX) 400 MG TABLET    Take 400 mg by mouth daily    MIDODRINE (PROAMATINE) 10 MG TABLET    Take 10 mg by mouth 3 times daily    MULTIPLE VITAMINS-MINERALS (THERAPEUTIC MULTIVITAMIN-MINERALS) TABLET    Take 1 tablet by mouth daily    NYSTATIN (MYCOSTATIN) 946569 UNIT/GM POWDER    Apply topically 2 times daily Apply topically under breasts    OMEPRAZOLE (PRILOSEC) 40 MG DELAYED RELEASE CAPSULE    Take 40 mg by mouth 2 times daily    OMEPRAZOLE (PRILOSEC) 40 MG DELAYED RELEASE CAPSULE    Take 40 mg by mouth 2 times daily    OXYCODONE-ACETAMINOPHEN (PERCOCET) 5-325 MG PER TABLET    Take 1 tablet by mouth every 4 hours as needed for Pain.     POLYETHYLENE GLYCOL (GLYCOLAX) 17 G PACKET    Take 17 g by mouth daily as needed for Constipation POTASSIUM CHLORIDE (KLOR-CON M) 20 MEQ EXTENDED RELEASE TABLET    Take 20 mEq by mouth daily    SILDENAFIL (REVATIO) 20 MG TABLET    Take 20 mg by mouth 3 times daily    SODIUM CHLORIDE (OCEAN, BABY AYR) 0.65 % NASAL SPRAY    1 spray by Nasal route as needed for Congestion    TORSEMIDE (DEMADEX) 20 MG TABLET    Take 40 mg by mouth 2 times daily    TRIAMCINOLONE (KENALOG) 0.1 % CREAM    Apply 1 each topically 2 times daily Apply topically 2 times daily. VITAMIN C (ASCORBIC ACID) 500 MG TABLET    Take 500 mg by mouth daily    VITAMIN D3 (CHOLECALCIFEROL) 25 MCG (1000 UT) TABS TABLET    Take 2,000 Units by mouth daily        ALLERGIES     Patient has no known allergies. FAMILY HISTORY     No family history on file. No family status information on file. SOCIAL HISTORY    reports that she has been smoking cigarettes. She has a 5.00 pack-year smoking history. She has never used smokeless tobacco. She reports current alcohol use. She reports that she does not use drugs. PHYSICAL EXAM    (up to 7 for level 4, 8 or more for level 5)     ED Triage Vitals   BP Temp Temp Source Pulse Resp SpO2 Height Weight   12/24/20 0911 12/24/20 0911 12/24/20 0911 12/24/20 0911 12/24/20 0911 12/24/20 0930 12/24/20 0911 12/24/20 0911   102/61 99.3 °F (37.4 °C) Oral 112 22 100 % 5' 3\" (1.6 m) 163 lb 9.3 oz (74.2 kg)       Physical Exam  Vitals signs and nursing note reviewed. Constitutional:       General: She is not in acute distress. Appearance: She is well-developed. HENT:      Head: Normocephalic and atraumatic. Eyes:      Conjunctiva/sclera: Conjunctivae normal.   Neck:      Musculoskeletal: Neck supple. Cardiovascular:      Rate and Rhythm: Normal rate and regular rhythm. Heart sounds: Normal heart sounds. Pulmonary:      Effort: Pulmonary effort is normal. No respiratory distress. Breath sounds: Normal breath sounds. Abdominal:      Tenderness: There is no abdominal tenderness. Musculoskeletal:      Comments: Right lower leg is erythematous, swollen, warm and very tender especially laterally. There is no crepitance. Dorsalis pedis pulse detected by US. MARIBEL 0.5. Skin:     General: Skin is warm and dry. Neurological:      Mental Status: She is alert and oriented to person, place, and time. Psychiatric:         Behavior: Behavior normal.            DIAGNOSTIC RESULTS     RADIOLOGY:   Non-plain film images such as CT, Ultrasound and MRI are read by the radiologist.Plain radiographic images are visualized and preliminarily interpreted by Deisy Mcneill PA-C with the below findings:        Interpretation per the Radiologist below, if available at the time of this note:    XR TIBIA FIBULA RIGHT (2 VIEWS)   Final Result   Diffuse demineralized bones without convincing radiographic evidence of   discrete cortical erosion or mario bony destructive changes are seen. If   there is persistent clinical concern for acute osteomyelitis, MRI is   recommended for further evaluation.              LABS:  Labs Reviewed   CBC WITH AUTO DIFFERENTIAL - Abnormal; Notable for the following components:       Result Value    WBC 12.1 (*)     RBC 3.13 (*)     Hemoglobin 8.7 (*)     Hematocrit 28.2 (*)     MCHC 30.9 (*)     RDW 21.4 (*)     Neutrophils Absolute 9.8 (*)     All other components within normal limits    Narrative:     Performed at:  [address] [address]   Phone [phone]   COMPREHENSIVE METABOLIC PANEL W/ REFLEX TO MG FOR LOW K - Abnormal; Notable for the following components:    CO2 20 (*)     Glucose 115 (*)     BUN 36 (*)     CREATININE 1.8 (*)     GFR Non- 29 (*)     GFR  35 (*)     Calcium 6.1 (*)     Total Protein 6.2 (*)     Alb 3.0 (*)     Albumin/Globulin Ratio 0.9 (*)     Total Bilirubin 1.1 (*)     Alkaline Phosphatase 424 (*)     All other components within normal limits Narrative:     Performed at:  [facility]  [address] [place], [place] [address]   Phone [phone]   PROTIME-INR - Abnormal; Notable for the following components:    Protime 22.2 (*)     INR 1.90 (*)     All other components within normal limits    Narrative:     Performed at:  [facility]  [address] [place], [place] [address]   Phone [phone]   CULTURE, BLOOD 2   CULTURE, BLOOD 1   LACTATE, SEPSIS    Narrative:     Performed at:  [facility]  [address] [place], [place] [address]   Phone [phone]   LACTATE, SEPSIS       All other labs were within normal range or not returned as of this dictation.     EMERGENCY DEPARTMENT COURSE and DIFFERENTIAL DIAGNOSIS/MDM:   Vitals:    Vitals:    12/24/20 1100 12/24/20 1115 12/24/20 1130 12/24/20 1145   BP: 103/74 125/88 120/78 129/79   Pulse: 113 108 110 117   Resp: 18 19 20 20   Temp:       TempSrc:       SpO2: 96%  94% 97%   Weight:       Height: I saw this patient with Dr. Susan Sanabria who spent face-to-face time with the patient and agreed with my assessment and plan. The patient was stable and nontoxic appearing, however is tachycardic and also became hypotensive. She is supposed to be on midodrine so this was given. She was given a 30 cc/kg fluid bolus. She was also placed in Trendelenburg and blood pressures have been stable. Dr. Susan Sanabria did place a central line in the event that pressors would be needed as well as because our peripheral access was limited. MARIBEL of 0.5. She has a white blood cell count elevation of 12.1. Her lactate was 1.9. She also has acute renal insufficiency. Right tib-fib x-ray showed demineralization but no significant evidence to suggest osteomyelitis and there was no free air noted. She was started on vancomycin and cefepime. Blood cultures were sent. A repeat lactic is being drawn given the patient's septicemia however severe sepsis at this time has been ruled out due to initial normal lactate. I spoke with the hospitalist Dr. Kenrick Kessler who will admit for further care. PROCEDURES:  None    FINAL IMPRESSION      1. Septicemia ([address]    2. Cellulitis of right lower extremity    3. PAD (peripheral artery disease) ([facility] [address]    4. Acute renal insufficiency          DISPOSITION/PLAN   DISPOSITION Admitted 12/24/2020 01:55:09 PM      PATIENT REFERRED TO:  No follow-up provider specified.     MEDICATIONS:  New Prescriptions    No medications on file       (Please note that portions of this note were completed with a voice recognition program.  Efforts were made toedit the dictations but occasionally words are mis-transcribed.)    RUBY Reddy PA-C  12/24/20 9760

## 2020-12-24 NOTE — PLAN OF CARE
Problem: Falls - Risk of:  Goal: Will remain free from falls  Description: Will remain free from falls  Outcome: Ongoing  Note: Pt free from injury or falls at this time, fall precautions in place, bed in low position, side rail up x2, [place] Fall Risk: High (45 and higher), bed alarm on, reoriented to room and call light, reminded not to get up without assistance, call light in reach, will continue to monitor. Pt verbalizes understanding of fall risk procedures. Goal: Absence of physical injury  Description: Absence of physical injury  Outcome: Ongoing  Note: Pt has not incurred any type of physical injury this shift. Problem: Skin Integrity:  Goal: Will show no infection signs and symptoms  Description: Will show no infection signs and symptoms  Outcome: Ongoing  Note: Pt has been afebrile since she arrived to floor. Will monitor for changes. Goal: Absence of new skin breakdown  Description: Absence of new skin breakdown  Outcome: Ongoing  Note: Pt with blanchable redness to sacrum and redness noted to RLE. Will monitor for changes. Problem: Pain:  Goal: Pain level will decrease  Description: Pain level will decrease  Outcome: Ongoing  Note: Pt c/o RLE pain rated 9/10. Will monitor and medicate as needed. Goal: Control of acute pain  Description: Control of acute pain  Outcome: Ongoing  Note: Pt c/o RLE pain rated 9/10. Will monitor and medicate as needed. Goal: Control of chronic pain  Description: Control of chronic pain  Outcome: Ongoing  Note: Pt has had no c/o chronic pain issues. Problem: Discharge Planning:  Goal: Discharged to appropriate level of care  Description: Discharged to appropriate level of care  Outcome: Ongoing  Note: Pt has been staying at [facility] for rehab, will need to return to facility at discharge.

## 2020-12-24 NOTE — PROGRESS NOTES
Pt arrived to room 3123 from ED. Vital signs taken and were WNL. Admission and assessment completed. Pt oriented to room, bed, phone, and call light. Fall prevention contract reviewed and signed. Fall precautions in place. Call light, bedside table and phone within easy reach. Pt instructed to use call light to call for assistance.

## 2020-12-24 NOTE — ED PROVIDER NOTES
I independently evaluated and obtained a history and physical on Jossue Pappas. All diagnostic, treatment, and disposition assistants were made to myself in conjunction the advanced practice provider. For further details of this patient's emergency department encounter, please see the advanced practice provider's documentation. History: This patient presents from the [facility] facility complaining of right lower pain. He states her last dose of Percocet was 6 hours ago. She typically takes Percocet every 4-6 hours. She denies any fever or chills. Physician Exam: Patient presents with some tachycardia, elevated temperature of 99.3. Her right lower extremity is red and warm below the knee. We could not palpate dorsalis pedis or posterior tibial pulses. Bedside ultrasound was used to find the DP. We then used ultrasound to determine the systolic blood pressure of the right lower extremity. It was 50. At the same time the patient's upper extremity systolic blood pressure was 98. This gives us an MARIBEL of 0.51. Work-up of this patient reveals an elevated INR of unclear etiology as the patient denies being on any anticoagulant use. Her BUN is 36 with a creatinine of 1.8. She has an elevated white count, tachycardia and anemia. The patient's blood pressure has been soft several times in the emergency department that coupled with the tachycardia, leukocytosis, and right lower extremity being red makes me concerned about the possibility of infection with sepsis. I spoke to the patient about the tenuous status of her upper extremity peripheral IV. It was not able to be completely introduced into the skin and some concerned that that is her only IV access. I spoke to the patient about the risk and benefit of a central venous catheter being placed. The patient provided verbal consent. The central line was placed under ultrasound guidance on the first pass. There was approximately 5 mL of blood that immediately surface through the skin after the sheath was placed make me concerned about the possibility of iatrogenic injury of the patient's femoral artery. Having said that, the blood was dark, it was nonpulsatile. Out of an abundance of caution we called the intensive care team nursing staff and they came down and we set the central venous catheter to an arterial line monitor and it did not show any arterial waveform in the pressure was 20-30 so I am convinced that the central venous catheter is in the patient's vein. I do appreciate the consultation of her vascular surgeon who recommended that. At this time it appears its venous. It was sutured in place. There is no further bleeding from the site. CRITICAL CARE TIME   Total Critical Care time was 30 minutes, excluding separately reportable procedures. There was a high probability of clinically significant/life threatening deterioration in the patient's condition which required my urgent intervention. Central Line    Date/Time: 12/26/2020 7:07 AM  Performed by: Shyanne Aguilar MD  Authorized by: Judith Ang MD     Consent:     Consent obtained:  Verbal    Consent given by:  Patient    Risks discussed:  Arterial puncture, bleeding, infection, incorrect placement and nerve damage    Alternatives discussed:  Alternative treatment  Pre-procedure details:     Skin preparation:  ChloraPrep  Anesthesia (see MAR for exact dosages):      Anesthesia method:  Local infiltration    Local anesthetic:  Lidocaine 1% w/o epi  Procedure details:     Location:  R femoral    Procedural supplies:  Triple lumen    Catheter size:  7.5 Fr    Landmarks identified: yes      Ultrasound guidance: yes      Sterile ultrasound techniques: Sterile gel and sterile probe covers were used      Number of attempts:  1    Successful placement: yes Post-procedure details:     Post-procedure:  Dressing applied and line sutured    Assessment:  Blood return through all ports and free fluid flow    Patient tolerance of procedure:   Tolerated well, no immediate complications  Comments:      Estimated blood loss 7 mL                   Ron Stockton MD  12/26/20 2850

## 2020-12-25 NOTE — PROGRESS NOTES
Pt transferred to room 5276 with RN x2. Pt tachypneic, tachycardic and is complaining of R leg pain. Previous RN unaware of current O2 sat. Pt currently on 25L via NRB. This RN attempting to get oxygen saturation but unable. RT called. HR 120s. 30bpm. Temp 99.6. Pt incontinent in bed. RN and PCA cleaned pt and repositioned. CMU called to verify telemonitor. Pt remains on 15L NRB until oxygen saturation is obtained. RN and RT will then wean as pt tolerates.  Lynne Davison

## 2020-12-25 NOTE — H&P
Hospital Medicine History & Physical      PCP: Malcolm Coleman MD    Date of Admission: 12/24/2020    Date of Service: Pt seen/examined on 12/24/2020 and Admitted to Inpatient     Chief Complaint: rt LE pain    History Of Present Illness: The patient is a 64 y.o. female who presents to [facility] with rt LE pain. Pt lives in a NH, presented to the ER 2/2 waking up in the morning with rt LE pain. Diagnosed with cellulitis, started on abx      Past Medical History:        Diagnosis Date    Abnormal uterine bleeding     Cellulitis 2/2013    left foot    Hypertension     Per[facility] ([facility]     Scleroderma ([facility] [address] [facility]        Past Surgical History:        Procedure Laterality Date    AMPUTATION  2/2013    left foot 5th toe    HYSTERECTOMY      total with both tubes and ovaries       Medications Prior to Admission:    Prior to Admission medications    Medication Sig Start Date End Date Taking?  Authorizing Provider   torsemide (DEMADEX) 20 MG tablet Take 40 mg by mouth 2 times daily   Yes Historical Provider, MD   sildenafil (REVATIO) 20 MG tablet Take 20 mg by mouth 3 times daily   Yes Historical Provider, MD   sodium chloride (OCEAN, BABY AYR) 0.65 % nasal spray 1 spray by Nasal route as needed for Congestion   Yes Historical Provider, MD   macitentan 10 MG TABS Take 10 mg by mouth daily   Yes Historical Provider, MD   allopurinol (ZYLOPRIM) 100 MG tablet Take 200 mg by mouth daily   Yes Historical Provider, MD   midodrine (PROAMATINE) 10 MG tablet Take 10 mg by mouth 3 times daily   Yes Historical Provider, MD   bisacodyl (DULCOLAX) 10 MG suppository Place 10 mg rectally daily as needed for Constipation    Yes Historical Provider, MD ferrous sulfate (FE TABS 325) 325 (65 Fe) MG EC tablet Take 325 mg by mouth 3 times daily (with meals)   Yes Historical Provider, MD   magnesium oxide (MAG-OX) 400 MG tablet Take 400 mg by mouth daily   Yes Historical Provider, MD   polyethylene glycol (GLYCOLAX) 17 g packet Take 17 g by mouth daily as needed for Constipation   Yes Historical Provider, MD   nystatin (MYCOSTATIN) 186465 UNIT/GM powder Apply topically 2 times daily Apply topically under breasts   Yes Historical Provider, MD   magnesium hydroxide (MILK OF MAGNESIA) 400 MG/5ML suspension Take by mouth daily as needed for Constipation   Yes Historical Provider, MD   omeprazole (PRILOSEC) 40 MG delayed release capsule Take 40 mg by mouth 2 times daily   Yes Historical Provider, MD   oxyCODONE-acetaminophen (PERCOCET) 5-325 MG per tablet Take 1 tablet by mouth every 4 hours as needed for Pain. Yes Historical Provider, MD   potassium chloride (KLOR-CON M) 20 MEQ extended release tablet Take 20 mEq by mouth daily   Yes Historical Provider, MD   omeprazole (PRILOSEC) 40 MG delayed release capsule Take 40 mg by mouth 2 times daily   Yes Historical Provider, MD   Multiple Vitamins-Minerals (THERAPEUTIC MULTIVITAMIN-MINERALS) tablet Take 1 tablet by mouth daily   Yes Historical Provider, MD   vitamin D3 (CHOLECALCIFEROL) 25 MCG (1000 UT) TABS tablet Take 2,000 Units by mouth daily    Yes Historical Provider, MD   atorvastatin (LIPITOR) 20 MG tablet Take 20 mg by mouth nightly   Yes Historical Provider, MD   aspirin 81 MG chewable tablet Take 81 mg by mouth daily    Yes Historical Provider, MD   vitamin C (ASCORBIC ACID) 500 MG tablet Take 500 mg by mouth daily   Yes Historical Provider, MD   triamcinolone (KENALOG) 0.1 % cream Apply 1 each topically 2 times daily Apply topically 2 times daily.    Yes Historical Provider, MD   hydrOXYzine (ATARAX) 25 MG tablet Take 25 mg by mouth every 8 hours as needed for Anxiety    Yes Historical Provider, MD acetaminophen (TYLENOL) 325 MG tablet Take 650 mg by mouth every 6 hours as needed for Pain or Fever    Yes Historical Provider, MD       Allergies:  Patient has no known allergies. Social History:  The patient currently lives in [address] [place] [address]:   reports that she has been smoking cigarettes. She has a 5.00 pack-year smoking history. She has never used smokeless tobacco.  ETOH:   reports current alcohol use. Family History:  Reviewed in detail and negative for DM, Early CAD, Cancer, CVA. Positive as follows:    No family history on file. REVIEW OF SYSTEMS:   Positive for rt LE pain and as noted in the HPI. All other systems reviewed and negative. PHYSICAL EXAM:    BP 91/62   Pulse 108   Temp 98.4 °F (36.9 °C) (Oral)   Resp 18   Ht 5' 3\" (1.6 m)   Wt 157 lb 3 oz (71.3 kg)   SpO2 97%   BMI 27.84 kg/m²     General appearance: awake  HEENT Normal cephalic, atraumatic without obvious deformity. Pupils equal, round, and reactive to light. Extra ocular muscles intact. Conjunctivae/corneas clear. Neck: Supple, No jugular venous distention/bruits. Trachea midline without thyromegaly or adenopathy with full range of motion. Lungs: Clear to auscultation, bilaterally without Rales/Wheezes/Rhonchi with good respiratory effort. Heart: Regular rhythm, tachy with Normal S1/S2  Abdomen: Soft, non-tender or non-distended without rigidity or guarding and positive bowel sounds  Extremities: No clubbing, cyanosis, no edema lt side, s/p lt BKA, rt LE tender to tough, 2+ edema  Skin: Skin color, texture, turgor normal.  No rashes or lesions. Neurologic: Alert and oriented to self, neurovascularly intact with sensory/motor intact upper extremities/lower extremities, bilaterally.   Cranial nerves: II-XII intact, grossly non-focal.  Mental status: Awake  Capillary Refill: Acceptable  < 3 seconds  Peripheral Pulses: +3 Easily felt, not easily obliterated with pressure      CBC   Recent Labs     12/24/20  1010

## 2020-12-25 NOTE — PROGRESS NOTES
Patient A&O in the bed. Patient tolerating PO intake well. PM medications given without complications. Patient denies nausea or vomiting. Patient complains of pain, PRN morphine given - see MAR. Call light within reach, able to make needs known, fall precautions in place. Will check on patient Q2 hours.

## 2020-12-25 NOTE — PROGRESS NOTES
4 Eyes Skin Assessment     NAME:  Partha Dias  YOB: 1960  MEDICAL RECORD NUMBER:  [id number]    The patient is being assess for  Transfer to New Unit    I agree that 2 RN's have performed a thorough Head to Toe Skin Assessment on the patient. ALL assessment sites listed below have been assessed. Areas assessed by both nurses:    Head, Face, Ears, Shoulders, Back, Chest, Arms, Elbows, Hands, Sacrum. Buttock, Coccyx, Ischium and Legs. Feet and Heels        Does the Patient have a Wound?  No noted wound(s)       Hemanth Prevention initiated:  Yes   Wound Care Orders initiated:  No    Pressure Injury (Stage 3,4, Unstageable, DTI, NWPT, and Complex wounds) if present place consult order under [de-identified] No    New and Established Ostomies if present place consult order under : No      Nurse 1 eSignature: Electronically signed by Marlon Hawkins RN on 12/25/20 at 10:27 AM EST    **SHARE this note so that the co-signing nurse is able to place an eSignature**    Nurse 2 eSignature: Electronically signed by Anish Lacy RN on 12/25/2020 at 11:00 AM

## 2020-12-25 NOTE — PROGRESS NOTES
MD Brown aware of repeat magnesium and critical calcium values. MD stated they will place orders. RN will continue to monitor pt while awaiting further orders. Pt resting comfortably on 8L HFNC with no signs of distress.  Electronically signed by Kassie Gamboa RN on 12/25/2020 at 5:16 PM

## 2020-12-25 NOTE — PLAN OF CARE
Problem: Falls - Risk of:  Goal: Will remain free from falls  Description: Will remain free from falls  Outcome: Ongoing   Fall risk assessment completed every shift. All precautions in place. Pt has call light within reach at all times. Room clear of clutter. Pt aware to call for assistance when getting up. Bed locked in lowest position, alarm engaged, call light within reach, will continue to monitor. Problem: Skin Integrity:  Goal: Will show no infection signs and symptoms  Description: Will show no infection signs and symptoms  Outcome: Ongoing   Skin assessment completed every shift. Pt assessed for incontinence, appropriate barrier cream applied prn. Pt encouraged to turn/rotate every 2 hours. Assistance provided if pt unable to do so themselves. Problem: Pain:  Goal: Pain level will decrease  Description: Pain level will decrease  Outcome: Ongoing  Goal: Control of acute pain  Description: Control of acute pain  Outcome: Ongoing  Goal: Control of chronic pain  Description: Control of chronic pain  Outcome: Ongoing   Pain/discomfort being managed with PRN analgesics per MD orders. Pt able to express presence and absence of pain and rate pain appropriately using numerical scale.     Problem: Discharge Planning:  Goal: Discharged to appropriate level of care  Description: Discharged to appropriate level of care  Outcome: Ongoing

## 2020-12-25 NOTE — PROGRESS NOTES
Patient asking for bedpan. This RN and PCA at bedside to assist with bathroom needs. Patient complaining of shortness of breath. Patient currently on 2L of O2 [place] home dependent. This RN instructing patient on deep breathing. Patient remains tachycardic with HR of 141 and complaints of shortness of breath. 6796- Patient spO2 checked, but no reading avaliable. This RN called charge RNSteph to inform that patient complaining of SOB and unable to determine patient's spO2 reading. Karina.Lime- this RN perfect serve Dr. Pietro Courtney MD to ask for orders for breathing treatment with respiratory. 0507- Patient placed on non-rebreather at 10L of O2 with O2 sat of 81%. Dr. Pietro Courtney MD informed via perfect serve. 9678- Patient still complaining of shortness of breath with 10L of O2 non-rebreather. 0825- Respiratory called to patient's room to assist patient with determining patient's O2 sat. 8135- Respiratory at patient's bedside. This unable to determine patient's spO2 even with multiple pulse ox attempts. 9077- Patient's VSS taken. Patient's bp is 81/47.  0832-Rapid response called. Code team and staff RNs at bedside. Patient's blood glucose level is 102. Patient remains A&O, but complaining of shortness of breath. 1884- Dr. Leone Kayser, MD at bedside  [facility] [address] get a SpO2 reading, STAT ABG ordered, order to transfer pt.  8010- [facility] transferred to PCU to room 5276. Bedside report given by this RN. No further questions at this time. Electronically signed by Rosie Mckeon RN on 12/25/2020 at 12:10 PM    3296- Patient's daughter, Andrewner Old notified of shift events and transfer to PCU.

## 2020-12-25 NOTE — SIGNIFICANT EVENT
Rapid response called  Arrived to room  Patient in respiratory distress  Complaining of SOB, weakness, no chest pain   has been having some cough  Nurses at bedside  Awake, alert  Lungs clear, no wheezes  [id number]  Abdomen soft  Moving extremities  Warm extremities  On oxygen non re breather  Hypotensive    Ordered ABG, CBC, CMP, Chest xray, COVID test all stat  Iv bolus  ABG noted  Magnesium low  Calcium low    Acute respiratory failure with hypoxia  unknown etiology at this time  Oxygen  Transfer to PCU  Chest xray with multifocal opacities  ABG ordered and noted  Saturation improving  COVID pending  2 g on magnesium sulfate iv  1 gram of calcium gluconate iv  Discussed with primary hospitalist who will follow up further  Cellulitis, continue antibiotics  Hypomagnesemia replacing  Hypocalcemia replacing    Total critical care time including responding to code, PE, ordering and following on critical labs, ordering critical medications in a patient with life threatening potential complications and not including any procedure time, 38 minutes.

## 2020-12-25 NOTE — PLAN OF CARE
Problem: Falls - Risk of:  Goal: Will remain free from falls  Description: Will remain free from falls  12/24/2020 1900 by Felice Duque RN  Outcome: Ongoing  Note: Fall risk assessment completed. Fall precautions in place. Call light within reach. Pt educated on calling for assistance before getting up. Walkway free of clutter. Will continue to monitor. 12/24/2020 1613 by Payton Zarate RN  Outcome: Ongoing  Note: Pt free from injury or falls at this time, fall precautions in place, bed in low position, side rail up x2, [place] Fall Risk: High (45 and higher), bed alarm on, reoriented to room and call light, reminded not to get up without assistance, call light in reach, will continue to monitor. Pt verbalizes understanding of fall risk procedures. Goal: Absence of physical injury  Description: Absence of physical injury  12/24/2020 1900 by Felice Duque RN  Outcome: Ongoing  12/24/2020 1613 by Payton Zarate RN  Outcome: Ongoing  Note: Pt has not incurred any type of physical injury this shift. Problem: Skin Integrity:  Goal: Will show no infection signs and symptoms  Description: Will show no infection signs and symptoms  12/24/2020 1900 by Felice Duque RN  Outcome: Ongoing  Note: Will monitor skin and mucous members. Will turn patient every 2 hours, monitor for friction and sheering, and change dressings as needed. Will preform skin assessment every shift. 12/24/2020 1613 by Payton Zarate RN  Outcome: Ongoing  Note: Pt has been afebrile since she arrived to floor. Will monitor for changes. Goal: Absence of new skin breakdown  Description: Absence of new skin breakdown  12/24/2020 1900 by Felice Duque RN  Outcome: Ongoing  12/24/2020 1613 by Payton Zarate RN  Outcome: Ongoing  Note: Pt with blanchable redness to sacrum and redness noted to RLE. Will monitor for changes.      Problem: Pain:  Goal: Pain level will decrease  Description: Pain level will decrease

## 2020-12-25 NOTE — CODE DOCUMENTATION
Covid rapid ordered, if negative then PCR needs to be obtained.  Patient to be transferred to [facility] 1230

## 2020-12-26 NOTE — PROGRESS NOTES
Physical Therapy    Facility/Department: [address] PROGRESSIVE CARE  Initial Assessment/Treatment Session    NAME: Jossue Pappas  : 1960  MRN: [id number]    Date of Service: 2020    Discharge Recommendations:  Patient would benefit from continued therapy after discharge, 3-5 sessions per week   PT Equipment Recommendations  Equipment Needed: No    Assessment   Body structures, Functions, Activity limitations: Decreased functional mobility ; Increased pain;Decreased ROM; Decreased strength;Decreased high-level IADLs;Decreased fine motor control;Decreased ADL status  Assessment: Pt is a 64 y.o. F. admitted from SNF for RLE pain, COVID R/O. She presents with elevated HR at rest (120s), very SOB, SPO2 85% on 4 L. O2. She c/o severe pain in her RLE, and could not tolerate AROM or PROM testing. She refused any OOB activity, and will require continued therapy to gradually improve strength, ROM, and independence with functional mobility tasks. Recommend return to low-moderate frequency therapy upon D/C. Jossue Pappas scored a 7/24 on the AM-PAC short mobility form. Current research shows that an AM-PAC score of 17 or less is typically not associated with a discharge to the patient's home setting. Based on the patient's AM-PAC score and their current functional mobility deficits, it is recommended that the patient have 3-5 sessions per week of Physical Therapy at d/c to increase the patient's independence. Please see assessment section for further patient specific details. If patient discharges prior to next session this note will serve as a discharge summary. Please see below for the latest assessment towards goals. Specific instructions for Next Treatment: Gradually progress EOB/OOB  Prognosis: Fair  Decision Making: Medium Complexity  History: See below  Exam: Strength; ROM; Bed mobility  Clinical Presentation: Evolving  PT Education: Goals; General Safety;PT Role;Plan of Care Barriers to Learning: Pain; Fatigue; SOB  REQUIRES PT FOLLOW UP: Yes  Activity Tolerance  Activity Tolerance: Patient limited by pain; Patient limited by fatigue;Patient limited by endurance       Patient Diagnosis(es): The primary encounter diagnosis was Septicemia ([facility] [address] Diagnoses of Cellulitis of right lower extremity, PAD (peripheral artery disease) ([address] and Acute renal insufficiency were also pertinent to this visit. has a past medical history of Abnormal uterine bleeding, Cellulitis, Hypertension, Peripheral vascular disease ([facility] [address] and Scleroderma ([facility] [address] has a past surgical history that includes amputation (2/2013) and Hysterectomy. Restrictions  Restrictions/Precautions  Restrictions/Precautions: Fall Risk  Position Activity Restriction  Other position/activity restrictions: LLE BKA; 4 L. O2 per nc (uses 2 L. at baseline)     Vision/Hearing  Vision: Within Functional Limits  Hearing: Within functional limits       Subjective  General  Chart Reviewed: Yes  Additional Pertinent Hx: Per Dr. Milka Hernandez, \"The patient is a 64 y.o. female who [facility] to [facility] with rt LE pain. Pt lives in a NH, presented to the ER 2/2 waking up in the morning with rt LE pain. Diagnosed with cellulitis, started on abx. \"  Referring Practitioner: Dr. Milka Hernandez  Referral Date : 12/24/20  Diagnosis: RLE Cellulitis  Follows Commands: Within Functional Limits  Subjective  Subjective: Pt c/o significant pain in RLE. Agreeable to evaluation with encouragement.   Pain Screening  Patient Currently in Pain: Yes    Orientation  Orientation  Overall Orientation Status: Within Normal Limits     Social/Functional History  Social/Functional History  Lives With: Alone  Type of Home: Apartment  Home Layout: One level  Home Access: Level entry  Bathroom Shower/Tub: Tub/Shower unit  Bathroom Toilet: Standard  Bathroom Equipment: Grab bars in shower, Shower chair  Home Equipment: [employer], Wheelchair-manual(Rollator) ADL Assistance: Independent  Homemaking Assistance: Needs assistance(HHA assisted 2 days/wk)  Ambulation Assistance: Independent(Mod I with rollator; wc for community use)  Transfer Assistance: Independent  Active : Yes  Mode of Transportation: Car  Occupation: On disability  Additional Comments: Pt reports she has been participating in rehab at [facility] for several months. Hopes to return to her apartment. Objective     PROM RLE (degrees)  RLE General PROM: Pt c/o too much pain to allow PROM testing at hip, knee, or ankle  AROM RLE (degrees)  RLE General AROM: No active hip flex, knee flex/ext d/t severe pain. Minimal ankle PF/DF ROM. AROM LLE (degrees)  LLE General AROM: Minimal hip flex, ABD/ADD, and knee flex/ext noted d/t pain. Resistant to PROM testing. AROM RUE (degrees)  RUE General AROM: Shoulder Flex ~ 45, Elbow Flex/Ext WFL. C/o pain with movement. AROM LUE (degrees)  LUE General AROM: Shoulder Flex ~ 45, Elbow Flex/Ext WFL. C/o pain with movement. Strength RLE  Strength RLE: Exception  Comment: Hip Flex, Knee Flex/Ext, and Ankle PF/DF grossly 1/5 (limited d/t pain; could not tolerate passive testing). Strength LLE  Strength LLE: Exception  Comment: Hip Flex, knee Flex/Ext grossly 1+/5 (limited d/t pain)  Strength RUE  Strength RUE: Exception  Comment: Shoulder Flex 2+/5, Elbow Flex/Ext 3/5  Strength LUE  Strength LUE: Exception  Comment: Shoulder Flex 2+/5, Elbow Flex/Ext 3/5     Bed mobility  Bridging: Maximum assistance  Rolling to Left: Maximum assistance     Transfers  Sit to Stand: Unable to assess  Stand to sit: Unable to assess     Ambulation  Ambulation?: No  Stairs/Curb  Stairs?: No      Plan   Plan  Times per week: 2-3x  Specific instructions for Next Treatment: Gradually progress EOB/OOB  Safety Devices  Type of devices:  All fall risk precautions in place, Call light within reach, Bed alarm in place, Left in bed, Nurse notified  Restraints Initially in place: No    AM-PAC Score  AM-PAC Inpatient Mobility Raw Score : 7 (12/26/20 0908)  AM-PAC Inpatient T-Scale Score : 26.42 (12/26/20 0908)  Mobility Inpatient CMS 0-100% Score: 92.36 (12/26/20 0908)  Mobility Inpatient CMS G-Code Modifier : CM (12/26/20 1190)          Goals  Short term goals  Time Frame for Short term goals: In 2-3 days pt will perform  Short term goal 1: Bed mobility Max A x 1  Short term goal 2: Transfer to bedside chair using LRAD  Short term goal 3: Tolerate 2 hours sitting in bedside chair  Long term goals  Time Frame for Long term goals : LTG = STG  Patient Goals   Patient goals : To reduce her pain, and eventually return home       Therapy Time   Individual Concurrent Group Co-treatment   Time In 0845         Time Out 0915         Minutes 30         Timed Code Treatment Minutes: 15 Minutes   Evaluation time: 15 min.      Desiree Duverney, PT    Electronically signed by Desiree Duverney, CARI [id number] on 12/26/2020 at 9:16 AM

## 2020-12-26 NOTE — PROGRESS NOTES
Hospitalist Progress Note      PCP: Mukesh Richards MD    Date of Admission: 12/24/2020    Subjective: cont to c/o pain rt LE, diuresing slowly    Medications:  Reviewed    Infusion Medications   Scheduled Medications    polyethylene glycol  17 g Oral Daily    furosemide  20 mg Intravenous BID    morphine  4 mg Intravenous Once    sodium chloride flush  10 mL Intravenous 2 times per day    enoxaparin  40 mg Subcutaneous Nightly    aspirin  81 mg Oral Daily    [Held by provider] atorvastatin  20 mg Oral Nightly    ferrous sulfate  324 mg Oral TID WC    macitentan  10 mg Oral Daily    magnesium oxide  400 mg Oral Daily    midodrine  10 mg Oral TID    therapeutic multivitamin-minerals  1 tablet Oral Daily    pantoprazole  40 mg Oral BID AC    sildenafil  20 mg Oral TID    cefepime  2 g Intravenous Q12H    vancomycin (VANCOCIN) intermittent dosing (placeholder)   Other RX Placeholder     PRN Meds: sodium chloride flush, acetaminophen **OR** acetaminophen, bisacodyl, hydrOXYzine, oxyCODONE-acetaminophen, morphine      Intake/Output Summary (Last 24 hours) at 12/26/2020 1846  Last data filed at 12/26/2020 0544  Gross per 24 hour   Intake 468.03 ml   Output    Net 468.03 ml       Physical Exam Performed:    BP 99/66   Pulse 120   Temp 98.5 °F (36.9 °C) (Axillary)   Resp 20   Ht 5' 3\" (1.6 m)   Wt 153 lb 14.1 oz (69.8 kg)   SpO2 92%   BMI 27.26 kg/m²     General appearance: awake  HEENT Normal cephalic, atraumatic without obvious deformity.  Pupils equal, round, and reactive to light.  Extra ocular muscles intact.  Conjunctivae/corneas clear. Neck: Supple, No jugular venous distention/bruits.  Trachea midline without thyromegaly or adenopathy with full range of motion. Lungs: Clear to auscultation, bilaterally without Rales/Wheezes/Rhonchi with good respiratory effort.   Heart: Regular rhythm, tachy with Normal S1/S2 Abdomen: Soft, non-tender or non-distended without rigidity or guarding and positive bowel sounds  Extremities: No clubbing, cyanosis, no edema lt side, s/p lt BKA, rt LE tender to touch, 2+ edema  Skin: Skin color, texture, turgor normal.  No rashes or lesions. Neurologic: Alert and oriented to self/place/time, neurovascularly intact with sensory/motor intact upper extremities/lower extremities, bilaterally.  Cranial nerves: II-XII intact, grossly non-focal.  Mental status: Awake  Capillary Refill: Acceptable  < 3 seconds  Peripheral Pulses: +3 Easily felt, not easily obliterated with pressure    Labs:   Recent Labs     12/25/20  0500 12/25/20  0846 12/25/20  0848 12/26/20  0608   WBC 15.3*  --  17.1* 18.3*   HGB 8.2* 10.2* 9.1* 7.9*   HCT 26.7*  --  29.3* 25.8*     --  326 251     Recent Labs     12/25/20  0500 12/25/20  0848 12/25/20  1548 12/25/20  2205 12/26/20  0608    139  --   --  136   K 3.5 3.8  --   --  3.7    105  --   --  101   CO2 18* 17*  --   --  20*   BUN 34* 34*  --   --  34*   CREATININE 1.5* 1.5*  --   --  1.5*   CALCIUM 5.6* 5.9* 5.8*  --  6.6*   PHOS 3.2  --   --  3.0 2.8     Recent Labs     12/24/20  1010 12/25/20  0848   AST 20 22   ALT 10 10   BILITOT 1.1* 1.2*   ALKPHOS 424* 365*     Recent Labs     12/24/20  1135 12/24/20  1613   INR 1.90* 1.89*     Recent Labs     12/24/20  1609   CKTOTAL 139       Urinalysis:    No results found for: NITRU, WBCUA, BACTERIA, RBCUA, BLOODU, SPECGRAV, GLUCOSEU    Radiology:  XR CHEST PORTABLE   Final Result   Mild multifocal bilateral pneumonia. CT TIBIA FIBULA RIGHT WO CONTRAST   Final Result   CT right femur:      1. Diffuse osteopenia. No acute osseous abnormality. No aggressive osseous   destruction. 2. 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 Small to moderate right knee   suprapatellar joint effusion. 3. Atherosclerotic calcification of the vasculature. 4. Mild right hip osteoarthrosis. 5. Fluid in the subcutaneous fat of the right lower abdominal and anterior   pelvic wall. Moderate circumferential edema in the subcutaneous fat about   the right thigh/right upper leg. 6. Mild-to-moderate atrophy and fatty degeneration of the right thigh   musculature. CT right tib fib:      1. Diffuse osteopenia. No acute osseous abnormality. No aggressive osseous   destruction. 2. Severe atherosclerotic calcification of the vasculature with probable   occlusive or near occlusive calcification at the level of the popliteal   artery. 3. Moderate edema in the subcutaneous fat about the right leg. 4. Mild-to-moderate atrophy and fatty degeneration of the visualized   musculature. 5. Severe narrowing of the lateral patellofemoral compartment. 6. Small tibiotalar and small to moderate right knee suprapatellar joint   effusions. CT FEMUR RIGHT WO CONTRAST   Final Result   CT right femur:      1. Diffuse osteopenia. No acute osseous abnormality. No aggressive osseous   destruction. 2. 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 Small to moderate right knee   suprapatellar joint effusion. 3. Atherosclerotic calcification of the vasculature. 4. Mild right hip osteoarthrosis. 5. Fluid in the subcutaneous fat of the right lower abdominal and anterior   pelvic wall. Moderate circumferential edema in the subcutaneous fat about   the right thigh/right upper leg. 6. Mild-to-moderate atrophy and fatty degeneration of the right thigh   musculature. CT right tib fib:      1. Diffuse osteopenia. No acute osseous abnormality. No aggressive osseous   destruction. 2. Severe atherosclerotic calcification of the vasculature with probable   occlusive or near occlusive calcification at the level of the popliteal   artery. 3. Moderate edema in the subcutaneous fat about the right leg. 4. Mild-to-moderate atrophy and fatty degeneration of the visualized   musculature. 5. Severe narrowing of the lateral patellofemoral compartment. 6. Small tibiotalar and small to moderate right knee suprapatellar joint   effusions. XR TIBIA FIBULA RIGHT (2 VIEWS)   Final Result   Diffuse demineralized bones without convincing radiographic evidence of   discrete cortical erosion or mario bony destructive changes are seen. If   there is persistent clinical concern for acute osteomyelitis, MRI is   recommended for further evaluation. Assessment/Plan:    Active Hospital Problems    Diagnosis    Cellulitis [L03.90]         Rt LE cellulitis - started on IV abx, await blood cx, xray reviewed. Reviewed CT LE with edema upto lower abdomen, check CPK normal     Acute hypoxic resp failure - placed on O2, wean as able     PNA - suspect gram neg PNA, cont IV abx, CXR reviewed, ID consulted. Worsening leucocytosis at 18. Check COVID PCR neg     Hypomag/hypocalcemia - replete     ARF with metabolic acidosis - was started on IVF, avoid nephrotoxic meds, creat minimally improved from 1.8-->1.5, check renal USG. Will place santiago, CT LE with edema upto lower abd, monitor creat on lasix     Fluid overload - 3+ edema, elevated BNP, started on IV lasix, monitor I/O and daily weights     Sepsis - POA, suspect 2/2 cellulitis. Await blood cx     Lactic acidosis - trend LA resolved     Anemia - hb 8.7-->7.9, check iron studies with iron def, check occult    Constipation - started on miralax        DVT Prophylaxis: lovenox  Diet: DIET CARDIAC;  Dental Soft  Code Status: Full Code    PT/OT Eval Status: ordered    Juany Gonzales MD

## 2020-12-26 NOTE — PROGRESS NOTES
Hospitalist Progress Note      PCP: Daniel Robert MD    Date of Admission: 12/24/2020    Subjective: pt was more hypotensive and rapid response called with increased O2 requirements, seen in PCU and appears more awake than yesterday, cont to c/o pain rt LE    Medications:  Reviewed    Infusion Medications   Scheduled Medications    morphine  4 mg Intravenous Once    sodium chloride flush  10 mL Intravenous 2 times per day    enoxaparin  40 mg Subcutaneous Nightly    aspirin  81 mg Oral Daily    [Held by provider] atorvastatin  20 mg Oral Nightly    ferrous sulfate  324 mg Oral TID WC    macitentan  10 mg Oral Daily    magnesium oxide  400 mg Oral Daily    midodrine  10 mg Oral TID    therapeutic multivitamin-minerals  1 tablet Oral Daily    pantoprazole  40 mg Oral BID AC    sildenafil  20 mg Oral TID    cefepime  2 g Intravenous Q12H    vancomycin (VANCOCIN) intermittent dosing (placeholder)   Other RX Placeholder     PRN Meds: sodium chloride flush, acetaminophen **OR** acetaminophen, bisacodyl, hydrOXYzine, oxyCODONE-acetaminophen, morphine      Intake/Output Summary (Last 24 hours) at 12/25/2020 2014  Last data filed at 12/25/2020 1400  Gross per 24 hour   Intake 600 ml   Output 350 ml   Net 250 ml       Physical Exam Performed:    /67   Pulse 102   Temp 98.7 °F (37.1 °C) (Oral)   Resp 22   Ht 5' 3\" (1.6 m)   Wt 156 lb 8.4 oz (71 kg)   SpO2 95%   BMI 27.73 kg/m²        General appearance: awake  HEENT Normal cephalic, atraumatic without obvious deformity. Pupils equal, round, and reactive to light. Extra ocular muscles intact. Conjunctivae/corneas clear. Neck: Supple, No jugular venous distention/bruits. Trachea midline without thyromegaly or adenopathy with full range of motion. Lungs: Clear to auscultation, bilaterally without Rales/Wheezes/Rhonchi with good respiratory effort.   Heart: Regular rhythm, tachy with Normal S1/S2 Abdomen: Soft, non-tender or non-distended without rigidity or guarding and positive bowel sounds  Extremities: No clubbing, cyanosis, no edema lt side, s/p lt BKA, rt LE tender to touch, 2+ edema  Skin: Skin color, texture, turgor normal.  No rashes or lesions. Neurologic: Alert and oriented to self/place/time, neurovascularly intact with sensory/motor intact upper extremities/lower extremities, bilaterally. Cranial nerves: II-XII intact, grossly non-focal.  Mental status: Awake  Capillary Refill: Acceptable  < 3 seconds  Peripheral Pulses: +3 Easily felt, not easily obliterated with pressure       Labs:   Recent Labs     12/24/20  1010 12/25/20  0500 12/25/20  0846 12/25/20  0848   WBC 12.1* 15.3*  --  17.1*   HGB 8.7* 8.2* 10.2* 9.1*   HCT 28.2* 26.7*  --  29.3*    307  --  326     Recent Labs     12/24/20  1010 12/25/20  0500 12/25/20  0848 12/25/20  1548    136 139  --    K 3.7 3.5 3.8  --     101 105  --    CO2 20* 18* 17*  --    BUN 36* 34* 34*  --    CREATININE 1.8* 1.5* 1.5*  --    CALCIUM 6.1* 5.6* 5.9* 5.8*   PHOS  --  3.2  --   --      Recent Labs     12/24/20  1010 12/25/20  0848   AST 20 22   ALT 10 10   BILITOT 1.1* 1.2*   ALKPHOS 424* 365*     Recent Labs     12/24/20  1135 12/24/20  1613   INR 1.90* 1.89*     Recent Labs     12/24/20  1609   CKTOTAL 139       Urinalysis:    No results found for: [place], BACTERIA, RBCUA, BLOODU, SPECGRAV, GLUCOSEU    Radiology:  XR CHEST PORTABLE   Final Result   Mild multifocal bilateral pneumonia. CT TIBIA FIBULA RIGHT WO CONTRAST   Final Result   CT right femur:      1. Diffuse osteopenia. No acute osseous abnormality. No aggressive osseous   destruction. 2. 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 Small to moderate right knee   suprapatellar joint effusion. 3. Atherosclerotic calcification of the vasculature. 4. Mild right hip osteoarthrosis. 5. Fluid in the subcutaneous fat of the right lower abdominal and anterior   pelvic wall. Moderate circumferential edema in the subcutaneous fat about   the right thigh/right upper leg. 6. Mild-to-moderate atrophy and fatty degeneration of the right thigh   musculature. CT right tib fib:      1. Diffuse osteopenia. No acute osseous abnormality. No aggressive osseous   destruction. 2. Severe atherosclerotic calcification of the vasculature with probable   occlusive or near occlusive calcification at the level of the popliteal   artery. 3. Moderate edema in the subcutaneous fat about the right leg. 4. Mild-to-moderate atrophy and fatty degeneration of the visualized   musculature. 5. Severe narrowing of the lateral patellofemoral compartment. 6. Small tibiotalar and small to moderate right knee suprapatellar joint   effusions. CT FEMUR RIGHT WO CONTRAST   Final Result   CT right femur:      1. Diffuse osteopenia. No acute osseous abnormality. No aggressive osseous   destruction. 2. 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 Small to moderate right knee   suprapatellar joint effusion. 3. Atherosclerotic calcification of the vasculature. 4. Mild right hip osteoarthrosis. 5. Fluid in the subcutaneous fat of the right lower abdominal and anterior   pelvic wall. Moderate circumferential edema in the subcutaneous fat about   the right thigh/right upper leg. 6. Mild-to-moderate atrophy and fatty degeneration of the right thigh   musculature. CT right tib fib:      1. Diffuse osteopenia. No acute osseous abnormality. No aggressive osseous   destruction. 2. Severe atherosclerotic calcification of the vasculature with probable   occlusive or near occlusive calcification at the level of the popliteal   artery. 3. Moderate edema in the subcutaneous fat about the right leg. 4. Mild-to-moderate atrophy and fatty degeneration of the visualized   musculature. 5. Severe narrowing of the lateral patellofemoral compartment. 6. Small tibiotalar and small to moderate right knee suprapatellar joint   effusions. XR TIBIA FIBULA RIGHT (2 VIEWS)   Final Result   Diffuse demineralized bones without convincing radiographic evidence of   discrete cortical erosion or mario bony destructive changes are seen. If   there is persistent clinical concern for acute osteomyelitis, MRI is   recommended for further evaluation. Assessment/Plan:    Active Hospital Problems    Diagnosis    Cellulitis [L03.90]         Rt LE cellulitis - started on IV abx, await blood cx, xray reviewed. Reviewed CT LE with edema upto lower abdomen, check CPK normal    Acute hypoxic resp failure - placed on O2, wean as able    PNA - suspect gram neg PNA, cont IV abx, CXR reviewed, ID consulted. Worsening leucocyosis. Check COVID PCR    Hypomag/hypocalcemia - replete     ARF - was started on IVF, avoid nephrotoxic meds, creat minimally improved from 1.8-->1.5, check renal USG. Will place santiago, CT LE with edema upto lower abd, monitor creat on lasix    Fluid overload - 3+ edema, elevated BNP, started on IV lasix, monitor I/O and daily weights     Sepsis - POA, suspect 2/2 cellulitis. Await blood cx    Lactic acidosis - trend LA     Anemia - hb 8.7, check iron studies with iron def, check occult        DVT Prophylaxis: lovenox  Diet: DIET CARDIAC;  Dental Soft  Code Status: Full Code    PT/OT Eval Status: ordered    Job Caller, MD

## 2020-12-26 NOTE — PROGRESS NOTES
D: pt is constipated, asking for a stool softener, only suppository ordered at this time A: this RN sent secure message to Dr. Toan Nick, asking for suppository R: will continue to monitor pt

## 2020-12-26 NOTE — PROGRESS NOTES
D:  Dr. Paolo Matson at bedside, pt complaining of pain in Right leg, this RN had just given pt morphine and pt refused percocet earlier A: this RN discussed this with Dr. Paolo Matson at bedside R: Dr. Paolo Matson explained how oral pain medication works versus iv pain medication and encouraged pt to try oral pain medication in order to help reduce pain for a longer time, pt agreed to plan of care

## 2020-12-26 NOTE — CONSULTS
Infectious Diseases Inpatient Consult Note      Reason for Consult:  Rt  Leg cellulitis and Leg pain     Requesting Physician:  Deric Bush     Primary Care Physician:  Myrna Gonzalez MD        CHIEF COMPLAINT:     Chief Complaint   Patient presents with    Leg Pain     right leg pain started over night was given percocet with no relief          HISTORY OF PRESENT ILLNESS:  64 y.o. woman with a history of severe scleroderma, CREST, on Home Oxygen  peripheral vascular disease, hypertension admitted with right leg pain and local redness and swelling. She is left below-knee amputation status from severe PVD in the past she states he usually ambulates wearing her prosthetic leg on the left side. The day before admission she woke up with the right leg pain swelling was concerned about cellulitis since presents to the hospital.  Admit labs indicate creatinine elevated 1.8 also had a lactic acid elevation up to 2.2, WBC elevation up to 18.3 since admission given the ongoing right lower committee cellulitis and WBC elevation we are consulted for recommendations. CT of the right femur indicates moderate edema and subcutaneous fat in the right leg also atrophy and degeneration of the visualized musculature with severe narrowing of the lateral femoral component along with severe atherosclerotic calcification probably occlusion or near occlusion at the level of popliteal artery. She is also requiring nasal cannula oxygen supplementation which is new from her baseline chest x-ray now showing multifocal bilateral pneumonia.   She was tested for COVID-19 negative on admission      Past Medical History:    Past Medical History:   Diagnosis Date    Abnormal uterine bleeding     Cellulitis 2/2013    left foot    Hypertension     Peripheral vascular disease ([address]     Scleroderma ([address]    CREST syndrome  Severe Pulmonary HTN   Drug use     Past Surgical History:    Past Surgical History: Procedure Laterality Date    AMPUTATION  2/2013    left foot 5th toe    HYSTERECTOMY      total with both tubes and ovaries   lEFT bka    Current Medications:    Outpatient Medications Marked as Taking for the 12/24/20 encounter [facility] Encounter)   Medication Sig Dispense Refill    torsemide (DEMADEX) 20 MG tablet Take 40 mg by mouth 2 times daily      sildenafil (REVATIO) 20 MG tablet Take 20 mg by mouth 3 times daily      sodium chloride (OCEAN, BABY AYR) 0.65 % nasal spray 1 spray by Nasal route as needed for Congestion      macitentan 10 MG TABS Take 10 mg by mouth daily      allopurinol (ZYLOPRIM) 100 MG tablet Take 200 mg by mouth daily      midodrine (PROAMATINE) 10 MG tablet Take 10 mg by mouth 3 times daily      bisacodyl (DULCOLAX) 10 MG suppository Place 10 mg rectally daily as needed for Constipation       ferrous sulfate (FE TABS 325) 325 (65 Fe) MG EC tablet Take 325 mg by mouth 3 times daily (with meals)      magnesium oxide (MAG-OX) 400 MG tablet Take 400 mg by mouth daily      polyethylene glycol (GLYCOLAX) 17 g packet Take 17 g by mouth daily as needed for Constipation      nystatin (MYCOSTATIN) 080076 UNIT/GM powder Apply topically 2 times daily Apply topically under breasts      magnesium hydroxide (MILK OF MAGNESIA) 400 MG/5ML suspension Take by mouth daily as needed for Constipation      omeprazole (PRILOSEC) 40 MG delayed release capsule Take 40 mg by mouth 2 times daily      oxyCODONE-acetaminophen (PERCOCET) 5-325 MG per tablet Take 1 tablet by mouth every 4 hours as needed for Pain.       potassium chloride (KLOR-CON M) 20 MEQ extended release tablet Take 20 mEq by mouth daily      omeprazole (PRILOSEC) 40 MG delayed release capsule Take 40 mg by mouth 2 times daily      Multiple Vitamins-Minerals (THERAPEUTIC MULTIVITAMIN-MINERALS) tablet Take 1 tablet by mouth daily      vitamin D3 (CHOLECALCIFEROL) 25 MCG (1000 UT) TABS tablet Take 2,000 Units by mouth daily  atorvastatin (LIPITOR) 20 MG tablet Take 20 mg by mouth nightly      aspirin 81 MG chewable tablet Take 81 mg by mouth daily       vitamin C (ASCORBIC ACID) 500 MG tablet Take 500 mg by mouth daily      triamcinolone (KENALOG) 0.1 % cream Apply 1 each topically 2 times daily Apply topically 2 times daily.  hydrOXYzine (ATARAX) 25 MG tablet Take 25 mg by mouth every 8 hours as needed for Anxiety       acetaminophen (TYLENOL) 325 MG tablet Take 650 mg by mouth every 6 hours as needed for Pain or Fever          Allergies:  Patient has no known allergies. Immunizations : There is no immunization history on file for this patient.       Social History:     Social History     Tobacco Use    Smoking status: Current Every Day Smoker     Packs/day: 0.50     Years: 10.00     Pack years: 5.00     Types: Cigarettes     Last attempt to quit: 2013     Years since quittin.9    Smokeless tobacco: Never Used   Substance Use Topics    Alcohol use: Yes     Comment: drinks beer - 4-6 cans per day - quit     Drug use: No     Social History     Tobacco Use   Smoking Status Current Every Day Smoker    Packs/day: 0.50    Years: 10.00    Pack years: 5.00    Types: Cigarettes    Last attempt to quit: 2013    Years since quittin.9   Smokeless Tobacco Never Used      Family : no DVT no COPD     REVIEW OF SYSTEMS:     Constitutional:  No  fevers  , chills, night sweats  Eyes:  negative for blurred vision, eye discharge, visual disturbance   HEENT:  negative for hearing loss, ear drainage,nasal congestion  Respiratory:  negative for cough, shortness of breath or hemoptysis   Cardiovascular:  negative for chest pain, palpitations, syncope  Gastrointestinal:  negative for nausea, vomiting, diarrhea, constipation, abdominal pain  Genitourinary:  negative for frequency, dysuria, urinary incontinence, hematuria  Hematologic/Lymphatic:  negative for easy bruising, bleeding and lymphadenopathy Allergic/Immunologic:  negative for recurrent infections, angioedema, anaphylaxis   Endocrine:  negative for weight changes, polyuria, polydipsia and polyphagia  Musculoskeletal:  Rt leg pain +   + swelling, decreased range of motion  Integumentary: No rashes, skin lesions  Neurological:  negative for headaches, slurred speech, unilateral weakness  Psychiatric: negative for hallucinations,confusion,agitation.      PHYSICAL EXAM:      Vitals:    /67   Pulse 113   Temp 98.2 °F (36.8 °C) (Oral)   Resp 20   Ht 5' 3\" (1.6 m)   Wt 153 lb 14.1 oz (69.8 kg)   SpO2 95%   BMI 27.26 kg/m²     General Appearance: alert,in   acute distress, + pallor, no icterus SKIN CHANGES FROM SEVERE SCLERODERMA++ hair loss and on nasal cannula++   Skin: warm and dry, no rash or erythema  Head: normocephalic and atraumatic  Eyes: pupils equal, round, and reactive to light, conjunctivae normal  ENT: tympanic membrane, external ear and ear canal normal bilaterally, nose without deformity, nasal mucosa and turbinates normal without polyps  Neck: supple and non-tender without mass, no thyromegaly  no cervical lymphadenopathy  Pulmonary/Chest: Bi basal crepts++ wheezes, rales or rhonchi, normal air movement, no respiratory distress  Cardiovascular: normal rate, regular rhythm, normal S1 and S2, no murmurs, rubs, clicks, or gallops, no carotid bruits  Abdomen: soft, non-tender, non-distended, normal bowel sounds, no masses or organomegaly  Extremities: no cyanosis, clubbing or edema  Left BKA+    Musculoskeletal: normal range of motion, no joint swelling, deformity or tenderness  Integumentary: No rashes, no abnormal skin lesions, no petechiae  Neurologic: reflexes normal and symmetric, no cranial nerve deficit  Psych:  Orientation, sensorium, mood normal   Lines: Rt Femoral line  Rt LEG on going skin changes and pain disproportionate to exam with PVD changes     DATA:    CBC:   Lab Results   Component Value Date WBC 18.3 (H) 12/26/2020    HGB 7.9 (L) 12/26/2020    HCT 25.8 (L) 12/26/2020    MCV 91.1 12/26/2020     12/26/2020     RENAL:   Lab Results   Component Value Date    CREATININE 1.5 (H) 12/26/2020    BUN 34 (H) 12/26/2020     12/26/2020    K 3.7 12/26/2020     12/26/2020    CO2 20 (L) 12/26/2020     SED RATE:   Lab Results   Component Value Date    SEDRATE 22 10/17/2013     CK:   Lab Results   Component Value Date    CKTOTAL 139 12/24/2020     CRP: No results found for: CRP  Hepatic Function Panel:   Lab Results   Component Value Date    ALKPHOS 365 12/25/2020    ALT 10 12/25/2020    AST 22 12/25/2020    PROT 5.7 12/25/2020    PROT 7.4 02/03/2013    BILITOT 1.2 12/25/2020    LABALBU 2.7 12/25/2020     UA:No results found for: Teola Nordmann, GLUCOSEU, Raul Kroner, SPECGRAV, BLOODU, PHUR, PROTEINU, UROBILINOGEN, NITRU, LEUKOCYTESUR, LABMICR, URINETYPE   Urine Microscopic: No results found for: LABCAST, BACTERIA, COMU, HYALCAST, WBCUA, RBCUA, EPIU  Urine Reflex to Culture: No results found for: URRFLXCULT    CREAT  1.8  DOWN TO 1.5     Lactic acid  2.8     WBC  18.3     12/25/2020  2:38 PM - [facility] Incoming Lab Results From Soft (Epic Adt)         Component Value Ref Range & Units Status Collected Lab   SARS-CoV-2, PCR Not Detected  Not Detected Final 12/25/2020 10:12 AM [email or web address]   This test has been authorized by FDA under an   Emergency Use Authorization (EUA).    This test is only authorized for the duration of the   time of declaration that circumstances exist justifying the   authorization of the emergency use of in vitro diagnostic        MICRO: cultures reviewed and updated by me            MRSA DNA Probe, Nasal [[id number]] Collected: 12/25/20 1256   Order Status: Sent Specimen: Nasal from Nares Updated: 12/25/20 1352   Culture, Blood 1 [[id number]] Collected: 12/24/20 1010   Order Status: Completed Specimen: Blood Updated: 12/25/20 6917 Blood Culture, Routine No Growth to date.  Any change in status will be called. Narrative:     ORDER#: [id number]                          ORDERED BY: CAT Tejada   SOURCE: Blood                              COLLECTED:  12/24/20 10:10   ANTIBIOTICS AT [facility]. :                      RECEIVED :  12/24/20 10:24   If child <=2 yrs old please draw pediatric bottle. ~Blood Culture #1   Performed at:   [facility]   [address], [place]    Phone [phone]   Culture, Blood 2 [[id number]] Collected: 12/24/20 1135   Order Status: Completed Specimen: Blood Updated: 12/25/20 1315    Culture, Blood 2 No Growth to date.  Any change in status will be called. Narrative:     ORDER#: [id number]                          ORDERED BY: CAT Tejada   SOURCE: Blood                              COLLECTED:  12/24/20 11:35   ANTIBIOTICS AT [facility]. :                      RECEIVED :  12/24/20 11:44   If child <=2 yrs old please draw pediatric bottle. ~Blood Culture #2   Performed at:   [facility]   [address], [place]    Phone [phone]       Blood Culture:   Lab Results   Component Value Date    [facility]  12/24/2020     No Growth to date. Any change in status will be called. BLOODCULT2  12/24/2020     No Growth to date. Any change in status will be called. Viral Culture:    Lab Results   Component Value Date    COVID19 Not Detected 12/25/2020     Urine Culture: No results for input(s): Tyrone Mcmillan in the last 72 hours.     Scheduled Meds:   furosemide  20 mg Intravenous BID    morphine  4 mg Intravenous Once    sodium chloride flush  10 mL Intravenous 2 times per day    enoxaparin  40 mg Subcutaneous Nightly    aspirin  81 mg Oral Daily    [Held by provider] atorvastatin  20 mg Oral Nightly    ferrous sulfate  324 mg Oral TID WC    macitentan  10 mg Oral Daily    magnesium oxide  400 mg Oral Daily  midodrine  10 mg Oral TID    therapeutic multivitamin-minerals  1 tablet Oral Daily    pantoprazole  40 mg Oral BID AC    sildenafil  20 mg Oral TID    cefepime  2 g Intravenous Q12H    vancomycin (VANCOCIN) intermittent dosing (placeholder)   Other RX Placeholder       Continuous Infusions:      PRN Meds:  sodium chloride flush, acetaminophen **OR** acetaminophen, bisacodyl, hydrOXYzine, oxyCODONE-acetaminophen, morphine    Imaging:   XR CHEST PORTABLE   Final Result   Mild multifocal bilateral pneumonia. CT TIBIA FIBULA RIGHT WO CONTRAST   Final Result   CT right femur:      1. Diffuse osteopenia. No acute osseous abnormality. No aggressive osseous   destruction. 2. 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 Small to moderate right knee   suprapatellar joint effusion. 3. Atherosclerotic calcification of the vasculature. 4. Mild right hip osteoarthrosis. 5. Fluid in the subcutaneous fat of the right lower abdominal and anterior   pelvic wall. Moderate circumferential edema in the subcutaneous fat about   the right thigh/right upper leg. 6. Mild-to-moderate atrophy and fatty degeneration of the right thigh   musculature. CT right tib fib:      1. Diffuse osteopenia. No acute osseous abnormality. No aggressive osseous   destruction. 2. Severe atherosclerotic calcification of the vasculature with probable   occlusive or near occlusive calcification at the level of the popliteal   artery. 3. Moderate edema in the subcutaneous fat about the right leg. 4. Mild-to-moderate atrophy and fatty degeneration of the visualized   musculature. 5. Severe narrowing of the lateral patellofemoral compartment. 6. Small tibiotalar and small to moderate right knee suprapatellar joint   effusions.          CT FEMUR RIGHT WO CONTRAST   Final Result   CT right femur: 1. Diffuse osteopenia. No acute osseous abnormality. No aggressive osseous   destruction. 2. 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 Small to moderate right knee   suprapatellar joint effusion. 3. Atherosclerotic calcification of the vasculature. 4. Mild right hip osteoarthrosis. 5. Fluid in the subcutaneous fat of the right lower abdominal and anterior   pelvic wall. Moderate circumferential edema in the subcutaneous fat about   the right thigh/right upper leg. 6. Mild-to-moderate atrophy and fatty degeneration of the right thigh   musculature. CT right tib fib:      1. Diffuse osteopenia. No acute osseous abnormality. No aggressive osseous   destruction. 2. Severe atherosclerotic calcification of the vasculature with probable   occlusive or near occlusive calcification at the level of the popliteal   artery. 3. Moderate edema in the subcutaneous fat about the right leg. 4. Mild-to-moderate atrophy and fatty degeneration of the visualized   musculature. 5. Severe narrowing of the lateral patellofemoral compartment. 6. Small tibiotalar and small to moderate right knee suprapatellar joint   effusions. XR TIBIA FIBULA RIGHT (2 VIEWS)   Final Result   Diffuse demineralized bones without convincing radiographic evidence of   discrete cortical erosion or mario bony destructive changes are seen. If   there is persistent clinical concern for acute osteomyelitis, MRI is   recommended for further evaluation. All pertinent images and reports for the current Hospitalization were reviewed by me.     IMPRESSION:    Patient Active Problem List   Diagnosis    Osteomyelitis of foot, left, acute (HCC)    Cellulitis and abscess of foot    Chest pain    Acute on chronic respiratory failure (HCC)    CREST syndrome (HCC)    Pulmonary hypertension ([address]    PAH (pulmonary artery hypertension) ([address]  Tobacco use    Cellulitis     Severe scleroderma  Severe pulmonary hypertension  Right leg pain swelling concern for ongoing ischemia based on presentation  Severe peripheral vascular disease  JESSICA on CKD  Multifocal pneumonia  Hypoxic respiratory failure she is on 4 lts at base line per previous records    Status post left below-knee amputation  CT of the right femur and tibia indicate severe atherosclerotic calcification of vasculature with probable occlusion at the level of popliteal artery would go along with current symptoms of below-knee peripheral vascular disease. WBC elevation  Lactic acidosis  Severe hypomagnesemia on admission  Anemia    Right leg exam is concerning her pain is very disproportionate to the examination indicating possible underlying acute worsening her peripheral vascular disease possible critical ischemia. CT of the right lower extremity indicates near complete occlusion below popliteal artery would go along with her present symptoms. She also has severe pulmonary hypertension at baseline and now with hypoxic respiratory failure with multifocal pneumonia. She was tested negative for COVID-19 on admission. She will require vascular evaluation and somewhat semiurgent basis we will discuss primary team about it. Agree with continuing antibiotic coverage for the multifocal pneumonia. Labs, Microbiology, Radiology and pertinent results from current hospitalization and care every where were reviewed by me as a part of the consultation. PLAN :  1. Cont IV Cefepime x 2 GM X q 12 hr  2. D/C IV Vancomycin due to JESSICA  3. IV Linezolid x 600 mg q 12 hrs   4. Watch platelets on Linezolid  5. Needs  vascular eval d/w Primary team   6. Over all prognosis poor from severe Pulmonary HTN and medical issues , CREST syndrome   7.  She was recent admit at [facility] see care every where     Discussed with patient/Family and Nursing   Risk of Complications/Morbidity: High · Illness(es)/ Infection present that pose threat to bodily function. · There is potential for severe exacerbation of infection/side effects of treatment. · Therapy requires intensive monitoring for antimicrobial agent toxicity. Thanks for allowing me to participate in your patient's care please call me with any questions or concerns.     Dr. Nelli Alcantara MD  [address] [place] [address] Physician  Phone: [phone]   Fax : [phone]

## 2020-12-26 NOTE — PLAN OF CARE
Problem: Falls - Risk of:  Goal: Will remain free from falls  Description: Will remain free from falls  12/26/2020 0021 by Batsheva Basurto RN  Outcome: Met This Shift  12/25/2020 1037 by Senait Barbour RN  Outcome: Ongoing  Goal: Absence of physical injury  Description: Absence of physical injury  12/26/2020 0021 by Batsheva Basurto RN  Outcome: Met This Shift  12/25/2020 1037 by Senait Barbour RN  Outcome: Ongoing   Alert and oriented Dyspnic with min. Exertion Sats. WNL on 4L O2 per n/c Medicated frequently for c/o RLE pain with fair short term effect. F/c patent draining clear dark yellow urine. Cath. Care php. Refuses to turn frequently even with education and encouragement provided.  Free from fall/injury

## 2020-12-26 NOTE — PROGRESS NOTES
Occupational Therapy  Pt seen by PT and she refused OOB activity and is in pain per RN note. PT recommending cotx for OOB going forward. Defer OT eval until Monday during cotx session.  Ashley Sandra OTR/JAZZY #[id number] 12/26/2020 1:23 PM

## 2020-12-27 NOTE — PROGRESS NOTES
R fem line d/c'ed per MD order. Patient flat and pressure/drsg applied per protocol. NO bleeding present. RLE ace-wrapped and elevated as patient rahat.  Electronically signed by Nichole Reese RN on 12/27/2020 at 3:23 PM

## 2020-12-27 NOTE — CONSULTS
Vascular Surgery Consultation    Date of Admission:  12/24/2020  9:03 AM  Date of Consultation:  12/27/2020    PCP:  Maria Esther Lopez MD       Chief Complaint: Right leg swelling and pain    History of Present Illness: We are asked to see this patient in consultation by Dr. Mackenzie Valera regarding possible ischemic limb. Jaimee Dean is a 64 y.o. female who has a history of severe pulmonary hypertension and PVD with prior LLE interventions and ultimately a L AKA. She reports increased swelling in RLE starting last week. Woke up 12/24 with significant pain in RLE. She has been admitted to hospital with diagnosis of cellulitis and started on IV antbx. CT of leg without contrast was performed showing diffuse edema in RLE and anterior abdominal wall. Calcifications of vessels noted with suspicion for stenosis/occlusion of popliteal artery. Of note, patient underwent CTA of lower extremity in 2015 at [address] [place] [address], documenting occlusion of popliteal artery. Patient has no open wounds, gangrenous digits, or weeping from leg/foot. A triple lumen catheter has been placed into the R Femoral vein. Pateint has baseline SOB and is on oxygen. Past Medical History:  Past Medical History:   Diagnosis Date    Abnormal uterine bleeding     Cellulitis 2/2013    left foot    Hypertension     Peripheral vascular disease ([address]     Scleroderma ([address]        Past Surgical History:  Past Surgical History:   Procedure Laterality Date    AMPUTATION  2/2013    left foot 5th toe    HYSTERECTOMY      total with both tubes and ovaries       Home Medications:   Prior to Admission medications    Medication Sig Start Date End Date Taking?  Authorizing Provider   torsemide (DEMADEX) 20 MG tablet Take 40 mg by mouth 2 times daily   Yes Historical Provider, MD   sildenafil (REVATIO) 20 MG tablet Take 20 mg by mouth 3 times daily   Yes Historical Provider, MD sodium chloride (OCEAN, BABY AYR) 0.65 % nasal spray 1 spray by Nasal route as needed for Congestion   Yes Historical Provider, MD   macitentan 10 MG TABS Take 10 mg by mouth daily   Yes Historical Provider, MD   allopurinol (ZYLOPRIM) 100 MG tablet Take 200 mg by mouth daily   Yes Historical Provider, MD   midodrine (PROAMATINE) 10 MG tablet Take 10 mg by mouth 3 times daily   Yes Historical Provider, MD   bisacodyl (DULCOLAX) 10 MG suppository Place 10 mg rectally daily as needed for Constipation    Yes Historical Provider, MD   ferrous sulfate (FE TABS 325) 325 (65 Fe) MG EC tablet Take 325 mg by mouth 3 times daily (with meals)   Yes Historical Provider, MD   magnesium oxide (MAG-OX) 400 MG tablet Take 400 mg by mouth daily   Yes Historical Provider, MD   polyethylene glycol (GLYCOLAX) 17 g packet Take 17 g by mouth daily as needed for Constipation   Yes Historical Provider, MD   nystatin (MYCOSTATIN) 808444 UNIT/GM powder Apply topically 2 times daily Apply topically under breasts   Yes Historical Provider, MD   magnesium hydroxide (MILK OF MAGNESIA) 400 MG/5ML suspension Take by mouth daily as needed for Constipation   Yes Historical Provider, MD   omeprazole (PRILOSEC) 40 MG delayed release capsule Take 40 mg by mouth 2 times daily   Yes Historical Provider, MD   oxyCODONE-acetaminophen (PERCOCET) 5-325 MG per tablet Take 1 tablet by mouth every 4 hours as needed for Pain.    Yes Historical Provider, MD   potassium chloride (KLOR-CON M) 20 MEQ extended release tablet Take 20 mEq by mouth daily   Yes Historical Provider, MD   omeprazole (PRILOSEC) 40 MG delayed release capsule Take 40 mg by mouth 2 times daily   Yes Historical Provider, MD   Multiple Vitamins-Minerals (THERAPEUTIC MULTIVITAMIN-MINERALS) tablet Take 1 tablet by mouth daily   Yes Historical Provider, MD   vitamin D3 (CHOLECALCIFEROL) 25 MCG (1000 UT) TABS tablet Take 2,000 Units by mouth daily    Yes Historical Provider, MD atorvastatin (LIPITOR) 20 MG tablet Take 20 mg by mouth nightly   Yes Historical Provider, MD   aspirin 81 MG chewable tablet Take 81 mg by mouth daily    Yes Historical Provider, MD   vitamin C (ASCORBIC ACID) 500 MG tablet Take 500 mg by mouth daily   Yes Historical Provider, MD   triamcinolone (KENALOG) 0.1 % cream Apply 1 each topically 2 times daily Apply topically 2 times daily. Yes Historical Provider, MD   hydrOXYzine (ATARAX) 25 MG tablet Take 25 mg by mouth every 8 hours as needed for Anxiety    Yes Historical Provider, MD   acetaminophen (TYLENOL) 325 MG tablet Take 650 mg by mouth every 6 hours as needed for Pain or Fever    Yes Historical Provider, MD        Facility Administered Medications:    linezolid  600 mg Intravenous Q12H    polyethylene glycol  17 g Oral Daily    furosemide  40 mg Intravenous BID    morphine  4 mg Intravenous Once    sodium chloride flush  10 mL Intravenous 2 times per day    enoxaparin  40 mg Subcutaneous Nightly    aspirin  81 mg Oral Daily    [Held by provider] atorvastatin  20 mg Oral Nightly    ferrous sulfate  324 mg Oral TID WC    macitentan  10 mg Oral Daily    magnesium oxide  400 mg Oral Daily    midodrine  10 mg Oral TID    therapeutic multivitamin-minerals  1 tablet Oral Daily    pantoprazole  40 mg Oral BID AC    sildenafil  20 mg Oral TID    cefepime  2 g Intravenous Q12H       Allergies:  Patient has no known allergies.      Social History:      Social History     Socioeconomic History    Marital status: Single     Spouse name: Not on file    Number of children: Not on file    Years of education: Not on file    Highest education level: Not on file   Occupational History    Not on file   Social Needs    Financial resource strain: Not on file    Food insecurity     Worry: Not on file     Inability: Not on file    Transportation needs     Medical: Not on file     Non-medical: Not on file   Tobacco Use Extremities: L AKA well healed. RLE with edema up to groin extending into abdominal wall. No ischemic changes to R Foot. R Foot is warm with good capp refill. Venous doppler signals continuous. R leg tender to palpation  Neuro/psychiatric: grossly intact. MEDICAL DECISION MAKING/TESTING      CT performed here and CTA at [facility] reviewed as noted in HPI      Labs:   CBC:   Recent Labs     12/25/20  0848 12/26/20  0608 12/27/20  0435   WBC 17.1* 18.3* 15.1*   HGB 9.1* 7.9* 7.8*   HCT 29.3* 25.8* 24.8*   MCV 90.0 91.1 90.4    251 231     BMP:   Recent Labs     12/25/20  0848 12/25/20  1548 12/25/20  2205 12/26/20  0608 12/27/20  0435     --   --  136 135*   K 3.8  --   --  3.7 3.8     --   --  101 102   CO2 17*  --   --  20* 18*   PHOS  --   --  3.0 2.8 3.0   BUN 34*  --   --  34* 33*   CREATININE 1.5*  --   --  1.5* 1.3*   CALCIUM 5.9* 5.8*  --  6.6* 7.5*   MG 0.50* 1.50* 2.00 1.80 1.80     Cardiac Enzymes:   Recent Labs     12/24/20  1609   CKTOTAL 139     PT/INR:   Recent Labs     12/24/20  1613   PROTIME 22.1*   INR 1.89*     APTT: No results for input(s): APTT in the last 72 hours. Liver Profile:  Lab Results   Component Value Date    AST 22 12/25/2020    ALT 10 12/25/2020    BILITOT 1.2 12/25/2020    ALKPHOS 365 12/25/2020     Lab Results   Component Value Date    CHOL 76 08/16/2018    HDL 34 08/16/2018    TRIG 47 08/16/2018     TSH:  No results found for: TSH  UA: No results found for: NITRITE, COLORU, PHUR, LABCAST, WBCUA, RBCUA, MUCUS, TRICHOMONAS, YEAST, BACTERIA, CLARITYU, SPECGRAV, LEUKOCYTESUR, UROBILINOGEN, BILIRUBINUR, BLOODU, GLUCOSEU, AMORPHOUS        Assessment:  RLE pain and edema. No ischemic changes and good arterial doppler signals. Current issues not arterial ischemia, however likely venous related- either venous hypertension secondary to Severe pulmonary hypertension, or possible DVT. Recommendations: I have recommended to Hospitalist, who I spoke to in person, removal of R Femoral Venous line, check D dimer. If D dimer elevated I would recommend systemic anticoagulation for possible DVT until a Venous Duplex scan can be performed tomorrow am.   Foot of bed and leg should be elevated as much as possible which will help decrease edema and provide some symptomatic relief. RLE can be ACE wrapped from toes to groin if patient can tolerate.

## 2020-12-27 NOTE — PLAN OF CARE
Problem: Falls - Risk of:  Goal: Will remain free from falls  Description: Will remain free from falls  12/27/2020 1251 by Esdras Perez RN  Outcome: Ongoing  12/27/2020 0205 by Caprice Cramer RN  Outcome: Met This Shift  Goal: Absence of physical injury  Description: Absence of physical injury  12/27/2020 1251 by Esdras Perez RN  Outcome: Ongoing  12/27/2020 0205 by Caprice Cramer RN  Outcome: Met This Shift     Problem: Skin Integrity:  Goal: Will show no infection signs and symptoms  Description: Will show no infection signs and symptoms  12/27/2020 1251 by Esdras Perez RN  Outcome: Ongoing  12/27/2020 0205 by Caprice Cramer RN  Outcome: Ongoing  Goal: Absence of new skin breakdown  Description: Absence of new skin breakdown  12/27/2020 1251 by Esdras Perez RN  Outcome: Ongoing  12/27/2020 0205 by Caprice Cramer RN  Outcome: Ongoing  Goal: Risk for impaired skin integrity will decrease  Description: Risk for impaired skin integrity will decrease  Outcome: Ongoing     Problem: Pain:  Goal: Pain level will decrease  Description: Pain level will decrease  12/27/2020 1251 by Esdras Perez RN  Outcome: Ongoing  12/27/2020 0205 by Caprice Cramer RN  Outcome: Ongoing  Goal: Control of acute pain  Description: Control of acute pain  12/27/2020 1251 by Esdras Perez RN  Outcome: Ongoing  12/27/2020 0205 by Caprice Cramer RN  Outcome: Ongoing  Goal: Control of chronic pain  Description: Control of chronic pain  12/27/2020 1251 by Esdras Perez RN  Outcome: Ongoing  12/27/2020 0205 by Caprice Cramer RN  Outcome: Ongoing     Problem: Pain:  Goal: Pain level will decrease  Description: Pain level will decrease  12/27/2020 1251 by Esdras Perez RN  Outcome: Ongoing  12/27/2020 0205 by Caprice Cramer RN  Outcome: Ongoing  Goal: Control of acute pain  Description: Control of acute pain  12/27/2020 1251 by Esdras Perez RN  Outcome: Ongoing  12/27/2020 0205 by Caprice Cramer RN  Outcome: Ongoing  Goal: Control of chronic pain Description: Control of chronic pain  12/27/2020 1251 by Allison Brooke RN  Outcome: Ongoing  12/27/2020 0205 by Batsheva Basurto RN  Outcome: Ongoing     Problem: Discharge Planning:  Goal: Discharged to appropriate level of care  Description: Discharged to appropriate level of care  12/27/2020 1251 by Allison Brooke RN  Outcome: Ongoing  12/27/2020 0205 by Batsheva Basurto RN  Outcome: Ongoing     Problem: Fluid Volume:  Goal: Ability to achieve a balanced intake and output will improve  Description: Ability to achieve a balanced intake and output will improve  Outcome: Ongoing     Problem: Physical Regulation:  Goal: Ability to maintain clinical measurements within normal limits will improve  Description: Ability to maintain clinical measurements within normal limits will improve  Outcome: Ongoing  Goal: Will show no signs and symptoms of electrolyte imbalance  Description: Will show no signs and symptoms of electrolyte imbalance  Outcome: Ongoing     Problem: Breathing Pattern - Ineffective:  Goal: Ability to achieve and maintain a regular respiratory rate will improve  Description: Ability to achieve and maintain a regular respiratory rate will improve  Outcome: Ongoing     Problem: Fluid Volume - Imbalance:  Goal: Absence of imbalanced fluid volume signs and symptoms  Description: Absence of imbalanced fluid volume signs and symptoms  Outcome: Ongoing     Problem: Activity:  Goal: Ability to tolerate increased activity will improve  Description: Ability to tolerate increased activity will improve  Outcome: Ongoing     Problem: Mobility - Impaired:  Goal: Mobility will improve  Description: Mobility will improve  Outcome: Ongoing     Problem:  Bowel/Gastric:  Goal: Control of bowel function will improve  Description: Control of bowel function will improve  Outcome: Ongoing  Goal: Ability to achieve a regular elimination pattern will improve  Description: Ability to achieve a regular elimination pattern will improve

## 2020-12-27 NOTE — PLAN OF CARE
Problem: DISCHARGE BARRIERS  Goal: Patient's continuum of care needs are met  Outcome: Ongoing     Problem: DAILY CARE  Goal: Daily care needs are met  Outcome: Ongoing   Patient's ability assessed to perform self care and independent activity encouraged according to that ability. Assisted with ADL's as needed.     Problem: SKIN INTEGRITY  Goal: Skin integrity is maintained or improved  Outcome: Ongoing     Problem: PAIN  Goal: Patient's pain/discomfort is manageable  Outcome: Ongoing

## 2020-12-27 NOTE — PROGRESS NOTES
Patient is AAO x 4. Dyspnea on exertion; patient stated \"it's because of pain. \" Noted with occasional wet cough. Lungs CTA diminished. Persistent generalized swelling. MD Brown aware; fluid restrictions 1800 ordered. Lasix order in place. BP on lower side; MD Brown notified. New order to d/c PRN morphine. Specialty mattress ordered to prevent further skin damage.  Electronically signed by Virginie Carr RN on 12/27/2020 at 12:47 PM

## 2020-12-27 NOTE — PROGRESS NOTES
Patient requested to remove ace wrap; stated \"I can't do this anymore. \" Edu ineffective. RLE elevated.  Electronically signed by Rakesh Lopes RN on 12/27/2020 at 5:02 PM

## 2020-12-27 NOTE — PROGRESS NOTES
Infectious Disease Follow up Notes  Admit Date: 12/24/2020  Hospital Day: 4    Antibiotics :   IV Lineozolid  IV CEFEPIME     CHIEF COMPLAINT:     Rt leg pain  PVD  PNA  CREST syndrome  WBC elevation     Subjective interval History :  64 y.o. woman with a history of severe scleroderma, CREST, on Home Oxygen  peripheral vascular disease, hypertension admitted with right leg pain and local redness and swelling. She is left below-knee amputation status from severe PVD in the past she states he usually ambulates wearing her prosthetic leg on the left side. The day before admission she woke up with the right leg pain swelling was concerned about cellulitis since presents to the hospital.  Admit labs indicate creatinine elevated 1.8 also had a lactic acid elevation up to 2.2, WBC elevation up to 18.3 since admission given the ongoing right lower committee cellulitis and WBC elevation we are consulted for recommendations. CT of the right femur indicates moderate edema and subcutaneous fat in the right leg also atrophy and degeneration of the visualized musculature with severe narrowing of the lateral femoral component along with severe atherosclerotic calcification probably occlusion or near occlusion at the level of popliteal artery. She is also requiring nasal cannula oxygen supplementation which is new from her baseline chest x-ray now showing multifocal bilateral pneumonia. She was tested for COVID-19 negative on admission       Interval History : Seen by vascular right leg pain and swelling improving. Local redness some improvement no fever remains on nasal cannula she is using 5 L, creatinine seems to be trending down WBC slowly trending up.       Past Medical History:    Past Medical History:   Diagnosis Date    Abnormal uterine bleeding     Cellulitis 2/2013    left foot    Hypertension     Peripheral vascular disease ([address]  Scleroderma ([facility] [address]    CREST syndrome  Severe Pulmonary HTN   Drug use     Past Surgical History:    Past Surgical History:   Procedure Laterality Date    AMPUTATION  2/2013    left foot 5th toe    HYSTERECTOMY      total with both tubes and ovaries   Left BKA     Current Medications:    Outpatient Medications Marked as Taking for the 12/24/20 encounter [facility] HOSPITAL Encounter)   Medication Sig Dispense Refill    torsemide (DEMADEX) 20 MG tablet Take 40 mg by mouth 2 times daily      sildenafil (REVATIO) 20 MG tablet Take 20 mg by mouth 3 times daily      sodium chloride (OCEAN, BABY AYR) 0.65 % nasal spray 1 spray by Nasal route as needed for Congestion      macitentan 10 MG TABS Take 10 mg by mouth daily      allopurinol (ZYLOPRIM) 100 MG tablet Take 200 mg by mouth daily      midodrine (PROAMATINE) 10 MG tablet Take 10 mg by mouth 3 times daily      bisacodyl (DULCOLAX) 10 MG suppository Place 10 mg rectally daily as needed for Constipation       ferrous sulfate (FE TABS 325) 325 (65 Fe) MG EC tablet Take 325 mg by mouth 3 times daily (with meals)      magnesium oxide (MAG-OX) 400 MG tablet Take 400 mg by mouth daily      polyethylene glycol (GLYCOLAX) 17 g packet Take 17 g by mouth daily as needed for Constipation      nystatin (MYCOSTATIN) 661274 UNIT/GM powder Apply topically 2 times daily Apply topically under breasts      magnesium hydroxide (MILK OF MAGNESIA) 400 MG/5ML suspension Take by mouth daily as needed for Constipation      omeprazole (PRILOSEC) 40 MG delayed release capsule Take 40 mg by mouth 2 times daily      oxyCODONE-acetaminophen (PERCOCET) 5-325 MG per tablet Take 1 tablet by mouth every 4 hours as needed for Pain.       potassium chloride (KLOR-CON M) 20 MEQ extended release tablet Take 20 mEq by mouth daily      omeprazole (PRILOSEC) 40 MG delayed release capsule Take 40 mg by mouth 2 times daily  Multiple Vitamins-Minerals (THERAPEUTIC MULTIVITAMIN-MINERALS) tablet Take 1 tablet by mouth daily      vitamin D3 (CHOLECALCIFEROL) 25 MCG (1000 UT) TABS tablet Take 2,000 Units by mouth daily       atorvastatin (LIPITOR) 20 MG tablet Take 20 mg by mouth nightly      aspirin 81 MG chewable tablet Take 81 mg by mouth daily       vitamin C (ASCORBIC ACID) 500 MG tablet Take 500 mg by mouth daily      triamcinolone (KENALOG) 0.1 % cream Apply 1 each topically 2 times daily Apply topically 2 times daily.  hydrOXYzine (ATARAX) 25 MG tablet Take 25 mg by mouth every 8 hours as needed for Anxiety       acetaminophen (TYLENOL) 325 MG tablet Take 650 mg by mouth every 6 hours as needed for Pain or Fever          Allergies:  Patient has no known allergies. Immunizations : There is no immunization history on file for this patient.     Social History:     Social History     Tobacco Use    Smoking status: Current Every Day Smoker     Packs/day: 0.50     Years: 10.00     Pack years: 5.00     Types: Cigarettes     Last attempt to quit: 2013     Years since quittin.9    Smokeless tobacco: Never Used   Substance Use Topics    Alcohol use: Yes     Comment: drinks beer - 4-6 cans per day - quit     Drug use: No     Social History     Tobacco Use   Smoking Status Current Every Day Smoker    Packs/day: 0.50    Years: 10.00    Pack years: 5.00    Types: Cigarettes    Last attempt to quit: 2013    Years since quittin.9   Smokeless Tobacco Never Used      Family History : no DVT no COPD       REVIEW OF SYSTEMS:     Constitutional:  negative for fevers, chills, night sweats  Eyes:  negative for blurred vision, eye discharge, visual disturbance   HEENT:  negative for hearing loss, ear drainage,nasal congestion  Respiratory:  cough + , shortness of breath + or hemoptysis   Cardiovascular:  negative for chest pain, palpitations, syncope Gastrointestinal:  negative for nausea, vomiting, diarrhea, constipation, abdominal pain  Genitourinary:  negative for frequency, dysuria, urinary incontinence, hematuria  Hematologic/Lymphatic:  negative for easy bruising, bleeding and lymphadenopathy  Allergic/Immunologic:  negative for recurrent infections, angioedema, anaphylaxis   Endocrine:  negative for weight changes, polyuria, polydipsia and polyphagia  Musculoskeletal: Rt leg pain and swelling++ swelling, decreased range of motion  Integumentary: No rashes, skin lesions  Neurological:  negative for headaches, slurred speech, unilateral weakness  Psychiatric: negative for hallucinations,confusion,agitation.                 PHYSICAL EXAM:      Vitals:    BP 90/62   Pulse 120   Temp 97.7 °F (36.5 °C) (Axillary)   Resp 20   Ht 5' 3\" (1.6 m)   Wt 154 lb 12.2 oz (70.2 kg)   SpO2 93%   BMI 27.42 kg/m²     General Appearance: alert,in   acute distress, + pallor, no icterus SKIN CHANGES FROM SEVERE SCLERODERMA++ hair loss and on nasal cannula++   Skin: warm and dry, no rash or erythema  Head: normocephalic and atraumatic  Eyes: pupils equal, round, and reactive to light, conjunctivae normal  ENT: tympanic membrane, external ear and ear canal normal bilaterally, nose without deformity, nasal mucosa and turbinates normal without polyps  Neck: supple and non-tender without mass, no thyromegaly  no cervical lymphadenopathy  Pulmonary/Chest: Bi basal crepts++ wheezes, rales or rhonchi, normal air movement, no respiratory distress  Cardiovascular: normal rate, regular rhythm, normal S1 and S2, no murmurs, rubs, clicks, or gallops, no carotid bruits  Abdomen: soft, non-tender, non-distended, normal bowel sounds, no masses or organomegaly  Extremities: no cyanosis, clubbing or edema  Left BKA+   Musculoskeletal: normal range of motion, no joint swelling, deformity or tenderness  Integumentary: No rashes, no abnormal skin lesions, no petechiae MICRO: cultures reviewed and updated by me   Blood Culture:   Lab Results   Component Value Date    [facility]  12/24/2020     No Growth to date. Any change in status will be called. BLOODCULT2  12/24/2020     No Growth to date. Any change in status will be called. Respiratory Culture:  No results found for: Williemadison Liao  AFB:No results found for: AFBSMEAR  Viral Culture:  Lab Results   Component Value Date    COVID19 Not Detected 12/25/2020     Urine Culture: No results for input(s): Yenny Saleh in the last 72 hours. IMAGING:    XR CHEST PORTABLE   Final Result   Mild multifocal bilateral pneumonia. CT TIBIA FIBULA RIGHT WO CONTRAST   Final Result   CT right femur:      1. Diffuse osteopenia. No acute osseous abnormality. No aggressive osseous   destruction. 2. 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 Small to moderate right knee   suprapatellar joint effusion. 3. Atherosclerotic calcification of the vasculature. 4. Mild right hip osteoarthrosis. 5. Fluid in the subcutaneous fat of the right lower abdominal and anterior   pelvic wall. Moderate circumferential edema in the subcutaneous fat about   the right thigh/right upper leg. 6. Mild-to-moderate atrophy and fatty degeneration of the right thigh   musculature. CT right tib fib:      1. Diffuse osteopenia. No acute osseous abnormality. No aggressive osseous   destruction. 2. Severe atherosclerotic calcification of the vasculature with probable   occlusive or near occlusive calcification at the level of the popliteal   artery. 3. Moderate edema in the subcutaneous fat about the right leg. 4. Mild-to-moderate atrophy and fatty degeneration of the visualized   musculature. 5. Severe narrowing of the lateral patellofemoral compartment. 6. Small tibiotalar and small to moderate right knee suprapatellar joint   effusions. She also has severe pulmonary hypertension at baseline and now with hypoxic respiratory failure with multifocal pneumonia. She was tested negative for COVID-19 on admission.     She will require vascular evaluation  NOTEs reviewed        Agree with continuing antibiotic coverage for the multifocal pneumonia. Labs, Microbiology, Radiology and all the pertinent results from current hospitalization and  care every where were reviewed  by me as a part of the evaluation   Plan:   1. Cont IV Cefepime x 1 GM X q 12 hr x lower the dose given CKD   2. Con IV Linezolid x 600 mg q 12 hrs   3. Once wbc improves will choose oral abx at d/c   4. Watch platelets on Linezolid  5. Needs  vascular eval d/w Primary team   6. Over all prognosis poor from severe Pulmonary HTN and medical issues , CREST syndrome   7. She was recent admit at [facility] see care every where   8. Vascular eval in progress    Discussed with patient/Family and Nursing staff   Risk of Complications/Morbidity: High      · Illness(es)/ Infection present that pose threat to bodily function. · There is potential for severe exacerbation of infection/side effects of treatment. · Therapy requires intensive monitoring for antimicrobial agent toxicity. Thanks for allowing me to participate in your patient's care and please call me with any questions or concerns.     Shelia Pillai MD  Infectious Disease  [facility] Physician  Phone: [phone]   Fax : [phone]

## 2020-12-28 NOTE — PROGRESS NOTES
Abdomen: Soft, non-tender or non-distended without rigidity or guarding and positive bowel sounds  Extremities: No clubbing, cyanosis, no edema lt side, s/p lt BKA, rt LE tender to touch, 2+ edema  Skin: Skin color, texture, turgor normal.  No rashes or lesions. Neurologic: Alert and oriented to self/place/time, neurovascularly intact with sensory/motor intact upper extremities/lower extremities, bilaterally.  Cranial nerves: II-XII intact, grossly non-focal.  Mental status: Awake  Capillary Refill: Acceptable  < 3 seconds  Peripheral Pulses: +3 Easily felt, not easily obliterated with pressure    Labs:   Recent Labs     12/25/20  0848 12/26/20  0608 12/27/20  0435   WBC 17.1* 18.3* 15.1*   HGB 9.1* 7.9* 7.8*   HCT 29.3* 25.8* 24.8*    251 231     Recent Labs     12/25/20  0848 12/25/20  1548 12/25/20  2205 12/26/20  0608 12/27/20  0435     --   --  136 135*   K 3.8  --   --  3.7 3.8     --   --  101 102   CO2 17*  --   --  20* 18*   BUN 34*  --   --  34* 33*   CREATININE 1.5*  --   --  1.5* 1.3*   CALCIUM 5.9* 5.8*  --  6.6* 7.5*   PHOS  --   --  3.0 2.8 3.0     Recent Labs     12/25/20  0848   AST 22   ALT 10   BILITOT 1.2*   ALKPHOS 365*     No results for input(s): INR in the last 72 hours. No results for input(s): Mitchell Boo in the last 72 hours. Urinalysis:    No results found for: Climmie Juaquin, BACTERIA, RBCUA, BLOODU, SPECGRAV, [address]    Radiology:  XR CHEST PORTABLE   Final Result   Mild multifocal bilateral pneumonia. CT TIBIA FIBULA RIGHT WO CONTRAST   Final Result   CT right femur:      1. Diffuse osteopenia. No acute osseous abnormality. No aggressive osseous   destruction. 2. 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 Small to moderate right knee   suprapatellar joint effusion. 3. Atherosclerotic calcification of the vasculature. 4. Mild right hip osteoarthrosis. 5. Fluid in the subcutaneous fat of the right lower abdominal and anterior   pelvic wall. Moderate circumferential edema in the subcutaneous fat about   the right thigh/right upper leg. 6. Mild-to-moderate atrophy and fatty degeneration of the right thigh   musculature. CT right tib fib:      1. Diffuse osteopenia. No acute osseous abnormality. No aggressive osseous   destruction. 2. Severe atherosclerotic calcification of the vasculature with probable   occlusive or near occlusive calcification at the level of the popliteal   artery. 3. Moderate edema in the subcutaneous fat about the right leg. 4. Mild-to-moderate atrophy and fatty degeneration of the visualized   musculature. 5. Severe narrowing of the lateral patellofemoral compartment. 6. Small tibiotalar and small to moderate right knee suprapatellar joint   effusions. CT FEMUR RIGHT WO CONTRAST   Final Result   CT right femur:      1. Diffuse osteopenia. No acute osseous abnormality. No aggressive osseous   destruction. 2. 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 Small to moderate right knee   suprapatellar joint effusion. 3. Atherosclerotic calcification of the vasculature. 4. Mild right hip osteoarthrosis. 5. Fluid in the subcutaneous fat of the right lower abdominal and anterior   pelvic wall. Moderate circumferential edema in the subcutaneous fat about   the right thigh/right upper leg. 6. Mild-to-moderate atrophy and fatty degeneration of the right thigh   musculature. CT right tib fib:      1. Diffuse osteopenia. No acute osseous abnormality. No aggressive osseous   destruction. 2. Severe atherosclerotic calcification of the vasculature with probable   occlusive or near occlusive calcification at the level of the popliteal   artery. 3. Moderate edema in the subcutaneous fat about the right leg. 4. Mild-to-moderate atrophy and fatty degeneration of the visualized   musculature. 5. Severe narrowing of the lateral patellofemoral compartment. 6. Small tibiotalar and small to moderate right knee suprapatellar joint   effusions. XR TIBIA FIBULA RIGHT (2 VIEWS)   Final Result   Diffuse demineralized bones without convincing radiographic evidence of   discrete cortical erosion or mario bony destructive changes are seen. If   there is persistent clinical concern for acute osteomyelitis, MRI is   recommended for further evaluation. Assessment/Plan:    Active Hospital Problems    Diagnosis    Cellulitis [L03.90]            Rt LE cellulitis - started on IV abx, await blood cx, xray reviewed. Reviewed CT LE with edema upto lower abdomen, check CPK normal, ID consulted. Check d dimer elevated. Will start heparin gtt and check doppler LE to r/o DVT     Acute hypoxic resp failure - placed on O2, wean as able     PNA - suspect gram neg PNA, cont IV abx, CXR reviewed, ID consulted. leucocytosis improved from 18-->15 Check COVID PCR neg    H/o PVD - vascular consulted per ID recs, pulses felt, does not appear to be arterial occlusion     Hypomag/hypocalcemia - replete     ARF with metabolic acidosis - was started on IVF, avoid nephrotoxic meds, creat minimally improved from 1.8-->1.5-->1.3, check renal USG. Placed santiago, CT LE with edema upto lower abd, monitor creat on lasix     Acute pulm HTN with cor pulmonale/Fluid overload - 3+ edema, elevated BNP, started on IV lasix, monitor I/O and daily weights, on revatio, check echo. Can consider cardio consult in am if needed     Sepsis - POA, suspect 2/2 cellulitis. Await blood cx     Lactic acidosis - trend LA resolved     Anemia - hb 8.7-->7.9, check iron studies with iron def, check occult     Constipation - started on miralax        DVT Prophylaxis: lovenox  Diet: DIET CARDIAC;  Dental Soft; Daily Fluid Restriction: 1800 ml Code Status: Full Code    PT/OT Eval Status: ordered    Dispo - cont care, palliative care consulted    Sherrie Young MD

## 2020-12-28 NOTE — PROGRESS NOTES
This pt admitted with cellulitis and pneumonia was transferred from [facility] earlier tonight is complaining of being SOB, she is sating 96% on 4L NC. she is wheezing and coughing. she is asking for breathing tx. Received order for breathing tx, Rt here administering Tx. With not much relief. pt sounds like she is fluid overloaded. /74,  spo2 92%, we had to put her on high flow 12L,  pt on Lasix BID. yesterday evening dose of Lasix was held because of low BP when pt was on [facility]. Notified eDann Pan. Received new order to give one time dose of  40mg lasix. Order carried out. .Electronically signed by Cristina Roman RN on 12/28/2020 at 1:57 AM

## 2020-12-28 NOTE — PROGRESS NOTES
Clinical Pharmacy Note  Heparin Dosing       Lab Results   Component Value Date    APTT 56.4 12/28/2020     Lab Results   Component Value Date    HGB 8.2 12/28/2020    HCT 26.5 12/28/2020     12/28/2020    INR 1.89 12/24/2020       Current Infusion Rate: 7.1 mL/hr    Plan:  Bolus:    Rate: 7.1 mL/hr  Next aPTT: 1830 12/28    Pharmacy will continue to monitor and adjust based on aPTT results.   Michelle Doty RPh 12/28/2020 1:34 PM

## 2020-12-28 NOTE — PROGRESS NOTES
Clinical Pharmacy Note  Heparin Dosing Consult    Cory Kaiser is a 64 y.o. female ordered heparin per high dose nomogram by Dr. Pearl Mathias. Lab Results   Component Value Date    APTT 30.8 08/15/2018     Lab Results   Component Value Date    HGB 7.8 12/27/2020    HCT 24.8 12/27/2020     12/27/2020    INR 1.89 12/24/2020       Ht Readings from Last 1 Encounters:   12/24/20 5' 3\" (1.6 m)        Wt Readings from Last 1 Encounters:   12/27/20 154 lb 12.2 oz (70.2 kg)     Dosing weight: 59.5 kg    Assessment/Plan:  Initial bolus: 4800 units  Initial infusion rate: 10.7 mL/hr  Next aPTT: 0030 12/28/20    Pharmacy will continue to monitor adjust heparin based on aPTT results using nomogram below:     HIGH DOSE HEPARIN PROTOCOL (DVT/PE)     Initial Bolus: 80 units/kg Max Bolus: 10,000 units       Initial Rate: 18 units/kg/hr Max Initial Rate: 2,100 units/hr     aPTT < 36   Heparin 80 units/kg bolus Increase infusion by 4 units/kg/hr       (maximum 10,000 units)   aPTT 37-48   Heparin 40 units/kg bolus Increase infusion by 2 units/kg/hr       (maximum 5,000 units)   aPTT 49-76   No bolus   No change   aPTT 77-85   No bolus   Decrease infusion by 2 units/kg/hr   aPTT 86-94   Hold heparin for 1 hour Decrease infusion by 3 units/kg/hr   aPTT      Hold heparin for 1 hour Decrease infusion by 4 units/kg/hr   aPTT > 103   Hold heparin for 1 hour Decrease infusion by 6 units/kg/hr    Obtain aPTT 6 hours after initial bolus and 6 hours after any dose change until two consecutive therapeutic aPTTs are achieved - then daily.

## 2020-12-28 NOTE — DISCHARGE INSTR - COC
COVID-19 Rule Out 12/25/20 12/25/20 12/25/20 COVID-19 (Ordered)   12/25/20 Rule-Out Test Resulted          Nurse Assessment:  Last Vital Signs: BP 96/65   Pulse 95   Temp 98.1 °F (36.7 °C) (Oral)   Resp 20   Ht 5' 3\" (1.6 m)   Wt 155 lb 6.8 oz (70.5 kg)   SpO2 97%   BMI 27.53 kg/m²     Last documented pain score (0-10 scale): Pain Level: 8  Last Weight:   Wt Readings from Last 1 Encounters:   12/28/20 155 lb 6.8 oz (70.5 kg)     Mental Status:  {IP PT MENTAL STATUS:20030}    IV Access:  { KATHY IV ACCESS:565009771}    Nursing Mobility/ADLs:  Walking   {CHP DME HEKH:817112643}  Transfer  {CHP DME IYOT:519151205}  Bathing  {CHP DME QQYH:965667008}  Dressing  {CHP DME LRLU:496392493}  Toileting  {CHP DME NVLJ:679051950}  Feeding  {P DME HABC:060917147}  Med Admin  {P DME OUEH:738338455}  Med Delivery   { KATHY MED Delivery:154086818}    Wound Care Documentation and Therapy:        Elimination:  Continence:   · Bowel: {YES / SO:05499}  · Bladder: {YES / PW:60024}  Urinary Catheter: {Urinary Catheter:669271679}   Colostomy/Ileostomy/Ileal Conduit: {YES / TX:41634}       Date of Last BM: ***    Intake/Output Summary (Last 24 hours) at 12/28/2020 1620  Last data filed at 12/28/2020 0651  Gross per 24 hour   Intake 360 ml   Output 350 ml   Net 10 ml     I/O last 3 completed shifts:   In: 360 [P.O.:360]  Out: 675 [Urine:675]    Safety Concerns:     [employer] KATHY Safety Concerns:628499843}    Impairments/Disabilities:      [employer] Impairments/Disabilities:461861475}    Nutrition Therapy:  Current Nutrition Therapy:   [employer] Diet List:824431742}    Routes of Feeding: {CHP DME Other Feedings:235803928}  Liquids: {Slp liquid thickness:60727}  Daily Fluid Restriction: {CHP DME Yes amt example:686023508}  Last Modified Barium Swallow with Video (Video Swallowing Test): {Done Not Done [facility]:144172248}    Treatments at the Time of Hospital Discharge:   Respiratory Treatments: ***  Oxygen Therapy:  {Therapy; copd oxygen:36097} Ventilator:    { CC Vent OWS}    Rehab Therapies: {THERAPEUTIC INTERVENTION:7586044818}  Weight Bearing Status/Restrictions: { CC Weight Bearin}  Other Medical Equipment (for information only, NOT a DME order):  {EQUIPMENT:373872295}  Other Treatments: ***    Patient's personal belongings (please select all that are sent with patient):  {CHP DME Belongings:770696151}    RN SIGNATURE:  {Esignature:564818340}    CASE MANAGEMENT/SOCIAL WORK SECTION    Inpatient Status Date: ***    Readmission Risk Assessment Score:  Readmission Risk              Risk of Unplanned Readmission:        20           Discharging to Facility/ Agency   · Name:   · Address:  · Phone:  · Fax:    Dialysis Facility (if applicable)   · Name:  · Address:  · Dialysis Schedule:  · Phone:  · Fax:    / signature: {Esignature:460381121}    PHYSICIAN SECTION    Prognosis: {Prognosis:6880204177}    Condition at Discharge: [address] [place] [address] Patient Condition:353355362}    Rehab Potential (if transferring to Rehab): {Prognosis:7507107129}    Recommended Labs or Other Treatments After Discharge: ***    Physician Certification: I certify the above information and transfer of Moncho Dailey  is necessary for the continuing treatment of the diagnosis listed and that she requires {Admit to Appropriate Level of Care:15560} for {GREATER/LESS:918927525} 30 days.      Update Admission H&P: {CHP DME Changes in DHPGB:707956101}    PHYSICIAN SIGNATURE:  {Esignature:759767572}

## 2020-12-28 NOTE — PLAN OF CARE
Problem: Falls - Risk of:  Goal: Will remain free from falls  Description: Will remain free from falls  Outcome: Ongoing  Goal: Absence of physical injury  Description: Absence of physical injury  Outcome: Ongoing     Problem: Skin Integrity:  Goal: Will show no infection signs and symptoms  Description: Will show no infection signs and symptoms  Outcome: Ongoing  Goal: Absence of new skin breakdown  Description: Absence of new skin breakdown  Outcome: Ongoing  Goal: Risk for impaired skin integrity will decrease  Description: Risk for impaired skin integrity will decrease  Outcome: Ongoing     Problem: Pain:  Goal: Pain level will decrease  Description: Pain level will decrease  Outcome: Ongoing  Goal: Control of acute pain  Description: Control of acute pain  Outcome: Ongoing  Goal: Control of chronic pain  Description: Control of chronic pain  Outcome: Ongoing     Problem: Discharge Planning:  Goal: Discharged to appropriate level of care  Description: Discharged to appropriate level of care  Outcome: Ongoing     Problem: Fluid Volume:  Goal: Ability to achieve a balanced intake and output will improve  Description: Ability to achieve a balanced intake and output will improve  Outcome: Ongoing     Problem: Physical Regulation:  Goal: Ability to maintain clinical measurements within normal limits will improve  Description: Ability to maintain clinical measurements within normal limits will improve  Outcome: Ongoing  Goal: Will show no signs and symptoms of electrolyte imbalance  Description: Will show no signs and symptoms of electrolyte imbalance  Outcome: Ongoing     Problem: Breathing Pattern - Ineffective:  Goal: Ability to achieve and maintain a regular respiratory rate will improve  Description: Ability to achieve and maintain a regular respiratory rate will improve  Outcome: Ongoing     Problem: Fluid Volume - Imbalance:  Goal: Absence of imbalanced fluid volume signs and symptoms Description: Absence of imbalanced fluid volume signs and symptoms  Outcome: Ongoing     Problem: Activity:  Goal: Ability to tolerate increased activity will improve  Description: Ability to tolerate increased activity will improve  Outcome: Ongoing     Problem: Mobility - Impaired:  Goal: Mobility will improve  Description: Mobility will improve  Outcome: Ongoing     Problem: Bowel/Gastric:  Goal: Control of bowel function will improve  Description: Control of bowel function will improve  Outcome: Ongoing  Goal: Ability to achieve a regular elimination pattern will improve  Description: Ability to achieve a regular elimination pattern will improve  Outcome: Ongoing     Problem: Nutritional:  Goal: Ability to follow a diet with enough fiber (20 to 30 grams) for normal bowel function will improve  Description: Ability to follow a diet with enough fiber (20 to 30 grams) for normal bowel function will improve  Outcome: Ongoing     Problem: SAFETY  Goal: Free from accidental physical injury  Outcome: Ongoing  Goal: Free from intentional harm  Outcome: Ongoing     Problem: DAILY CARE  Goal: Daily care needs are met  Outcome: Ongoing     Problem: PAIN  Goal: Patient's pain/discomfort is manageable  Outcome: Ongoing     Problem: SKIN INTEGRITY  Goal: Skin integrity is maintained or improved  Outcome: Ongoing     Problem: KNOWLEDGE DEFICIT  Goal: Patient/S.O. demonstrates understanding of disease process, treatment plan, medications, and discharge instructions. Outcome: Ongoing     Problem: KNOWLEDGE DEFICIT  Goal: Patient/S.O. demonstrates understanding of disease process, treatment plan, medications, and discharge instructions.   Outcome: Ongoing     Problem: DISCHARGE BARRIERS  Goal: Patient's continuum of care needs are met  Outcome: Ongoing

## 2020-12-28 NOTE — CONSULTS
Medication Sig Start Date End Date Taking? Authorizing Provider   torsemide (DEMADEX) 20 MG tablet Take 40 mg by mouth 2 times daily   Yes Historical Provider, MD   sildenafil (REVATIO) 20 MG tablet Take 20 mg by mouth 3 times daily   Yes Historical Provider, MD   sodium chloride (OCEAN, BABY AYR) 0.65 % nasal spray 1 spray by Nasal route as needed for Congestion   Yes Historical Provider, MD   macitentan 10 MG TABS Take 10 mg by mouth daily   Yes Historical Provider, MD   allopurinol (ZYLOPRIM) 100 MG tablet Take 200 mg by mouth daily   Yes Historical Provider, MD   midodrine (PROAMATINE) 10 MG tablet Take 10 mg by mouth 3 times daily   Yes Historical Provider, MD   bisacodyl (DULCOLAX) 10 MG suppository Place 10 mg rectally daily as needed for Constipation    Yes Historical Provider, MD   ferrous sulfate (FE TABS 325) 325 (65 Fe) MG EC tablet Take 325 mg by mouth 3 times daily (with meals)   Yes Historical Provider, MD   magnesium oxide (MAG-OX) 400 MG tablet Take 400 mg by mouth daily   Yes Historical Provider, MD   polyethylene glycol (GLYCOLAX) 17 g packet Take 17 g by mouth daily as needed for Constipation   Yes Historical Provider, MD   nystatin (MYCOSTATIN) 994594 UNIT/GM powder Apply topically 2 times daily Apply topically under breasts   Yes Historical Provider, MD   magnesium hydroxide (MILK OF MAGNESIA) 400 MG/5ML suspension Take by mouth daily as needed for Constipation   Yes Historical Provider, MD   omeprazole (PRILOSEC) 40 MG delayed release capsule Take 40 mg by mouth 2 times daily   Yes Historical Provider, MD   oxyCODONE-acetaminophen (PERCOCET) 5-325 MG per tablet Take 1 tablet by mouth every 4 hours as needed for Pain.    Yes Historical Provider, MD   potassium chloride (KLOR-CON M) 20 MEQ extended release tablet Take 20 mEq by mouth daily   Yes Historical Provider, MD   omeprazole (PRILOSEC) 40 MG delayed release capsule Take 40 mg by mouth 2 times daily   Yes Historical Provider, MD Multiple Vitamins-Minerals (THERAPEUTIC MULTIVITAMIN-MINERALS) tablet Take 1 tablet by mouth daily   Yes Historical Provider, MD   vitamin D3 (CHOLECALCIFEROL) 25 MCG (1000 UT) TABS tablet Take 2,000 Units by mouth daily    Yes Historical Provider, MD   atorvastatin (LIPITOR) 20 MG tablet Take 20 mg by mouth nightly   Yes Historical Provider, MD   aspirin 81 MG chewable tablet Take 81 mg by mouth daily    Yes Historical Provider, MD   vitamin C (ASCORBIC ACID) 500 MG tablet Take 500 mg by mouth daily   Yes Historical Provider, MD   triamcinolone (KENALOG) 0.1 % cream Apply 1 each topically 2 times daily Apply topically 2 times daily. Yes Historical Provider, MD   hydrOXYzine (ATARAX) 25 MG tablet Take 25 mg by mouth every 8 hours as needed for Anxiety    Yes Historical Provider, MD   acetaminophen (TYLENOL) 325 MG tablet Take 650 mg by mouth every 6 hours as needed for Pain or Fever    Yes Historical Provider, MD    Scheduled Meds:   cefepime  1 g Intravenous Q12H    linezolid  600 mg Intravenous Q12H    lidocaine 1 % injection  5 mL Intradermal Once    sodium chloride flush  10 mL Intravenous 2 times per day    midodrine  10 mg Oral TID WC    nystatin   Topical BID    polyethylene glycol  17 g Oral Daily    furosemide  40 mg Intravenous BID    morphine  4 mg Intravenous Once    aspirin  81 mg Oral Daily    [Held by provider] atorvastatin  20 mg Oral Nightly    ferrous sulfate  324 mg Oral TID WC    macitentan  10 mg Oral Daily    magnesium oxide  400 mg Oral Daily    therapeutic multivitamin-minerals  1 tablet Oral Daily    pantoprazole  40 mg Oral BID AC    sildenafil  20 mg Oral TID     Continuous Infusions:   heparin (PORCINE) Infusion 7.1 mL/hr (12/28/20 0233)     PRN Meds:. HYDROmorphone **OR** HYDROmorphone, sodium chloride flush, oxyCODONE-acetaminophen, perflutren lipid microspheres, acetaminophen **OR** acetaminophen, bisacodyl, hydrOXYzine  Allergies  has No Known Allergies. Family History  Reviewedfamily history is not on file. Social History   reports that she has been smoking cigarettes. She has a 5.00 pack-year smoking history. She has never used smokeless tobacco. She reports current alcohol use. She reports that she does not use drugs. EDUCATION  Patient educated about their illness/diagnosis, stated above, and all questions answered. We discussed the importance of nutrition, medications they are taking, and healthy lifestyle. Review of Systems:  General Denies any fever or chills  HEENT Denies any diplopia, tinnitus or vertigo  Resp Denies any shortness of breath, cough or wheezing  Cardiac Denies any chest pain, palpitations, claudication or edema  GI Denies any melena, hematochezia, hematemesis or pyrosis   Denies any frequency, urgency, hesitancy or incontinence  Heme Denies bruising or bleeding easily  Neuro Denies any focal motor or sensory deficits  OBJECTIVE:   VITALS:  height is 5' 3\" (1.6 m) and weight is 155 lb 6.8 oz (70.5 kg). Her oral temperature is 98.4 °F (36.9 °C). Her blood pressure is 96/61 and her pulse is 120. Her respiration is 20 and oxygen saturation is 93%. CONSTITUTIONAL: Alert and oriented times 3, no acute distress and cooperative to examination with proper mood and affect. SKIN: +Erythema RLE, texture, turgor normal. No rashes or lesions. LYMPH: no cervical nodes, no inguinal nodes  HEENT: Head is normocephalic, atraumatic. EOMI, PERRLA. NECK: Supple, symmetrical, trachea midline, no adenopathy, thyroid symmetric, not enlarged and no tenderness, skin normal.  CHEST/LUNGS: chest symmetric with normal A/P diameter, normal respiratory rate and rhythm   CARDIOVASCULAR: Tachycardia  ABDOMEN: Normal shape. Tenderness: absent. RECTAL: deferred, not clinically indicated  NEUROLOGIC: There are no focalizing motor or sensory deficits. CN II-XII are grossly intact. Rut Lerner EXTREMITIES: RLE: edema, erythema, and pain. +stong Doppler signal of DP, very weak doppler signal in the PT. LLE BKA  LABS:     Recent Labs     12/26/20  0608 12/27/20  0435 12/28/20  0520   WBC 18.3* 15.1* 13.9*   HGB 7.9* 7.8* 8.2*   HCT 25.8* 24.8* 26.5*    231 243    135* 135*   K 3.7 3.8 3.6    102 103   CO2 20* 18* 19*   BUN 34* 33* 33*   CREATININE 1.5* 1.3* 1.5*   MG 1.80 1.80 1.80   PHOS 2.8 3.0 2.9   CALCIUM 6.6* 7.5* 8.4     No results for input(s): ALKPHOS, ALT, AST, BILITOT, BILIDIR, LABALBU, AMYLASE, LIPASE in the last 72 hours. CBC:   Lab Results   Component Value Date    WBC 13.9 12/28/2020    RBC 2.87 12/28/2020    HGB 8.2 12/28/2020    HCT 26.5 12/28/2020    MCV 92.4 12/28/2020    MCH 28.7 12/28/2020    MCHC 31.1 12/28/2020    RDW 21.2 12/28/2020     12/28/2020    MPV 9.5 12/28/2020     CMP:    Lab Results   Component Value Date     12/28/2020    K 3.6 12/28/2020    K 3.7 12/24/2020     12/28/2020    CO2 19 12/28/2020    BUN 33 12/28/2020    CREATININE 1.5 12/28/2020    GFRAA 43 12/28/2020    GFRAA >60 02/07/2013    AGRATIO 0.9 12/25/2020    LABGLOM 36 12/28/2020    GLUCOSE 74 12/28/2020    PROT 5.7 12/25/2020    PROT 7.4 02/03/2013    LABALBU 2.7 12/25/2020    CALCIUM 8.4 12/28/2020    BILITOT 1.2 12/25/2020    ALKPHOS 365 12/25/2020    AST 22 12/25/2020    ALT 10 12/25/2020     Urine Culture:  No components found for: CURINE  Blood Culture:  No components found for: CBLOOD, CFUNGUSBL  RADIOLOGY:     CT right femur and tib/fib 12/24/20  CT right femur:       1. Diffuse osteopenia.  No acute osseous abnormality.  No aggressive osseous   destruction. 2. 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  Small to moderate right knee   suprapatellar joint effusion. 3. Atherosclerotic calcification of the vasculature. 4. Mild right hip osteoarthrosis. 5. Fluid in the subcutaneous fat of the right lower abdominal and anterior   pelvic wall.  Moderate circumferential edema in the subcutaneous fat about   the right thigh/right upper leg. 6. Mild-to-moderate atrophy and fatty degeneration of the right thigh   musculature. CT right tib fib:       1. Diffuse osteopenia.  No acute osseous abnormality.  No aggressive osseous   destruction. 2. Severe atherosclerotic calcification of the vasculature with probable   occlusive or near occlusive calcification at the level of the popliteal   artery. 3. Moderate edema in the subcutaneous fat about the right leg. 4. Mild-to-moderate atrophy and fatty degeneration of the visualized   musculature. 5. Severe narrowing of the lateral patellofemoral compartment. 6. Small tibiotalar and small to moderate right knee suprapatellar joint   effusions. Doppler RLE 12/28/20:  Right   Technically limited visualization of the right lower extremity due to severe   edema and patient unable to tolerate probe pressure. No evidence of deep vein or superficial vein thrombosis involving the right   lower extremity. Pitting edema noted in the right lower extremity. Left   No evidence of deep vein or superficial vein thrombosis involving the left   lower extremity. Pitting edema noted in the left lower extremity. Left below knee amputation. Thank you for the interesting evaluation. Further recommendations to follow. Vy Sam  General and Vascular Surgery [phone]  Electronically signed by EMANUEL Mohan on 12/28/2020 at 2:59 PM

## 2020-12-28 NOTE — PROGRESS NOTES
Occupational Therapy  OT orders acknowledged, chart reviewed. Pt has excessive amount of R LE pain & states she is \"cold\"--R LE is sensitive & warm to the touch--RN aware pt asking for pain meds (pt did not hit call light). OT assisted w/ set up for breakfast however she was unable to participate in evaluation at this time. Will re-attempt eval later if time allows.  Halina Arita, OTR/L #[id number]

## 2020-12-28 NOTE — PROGRESS NOTES
Patient A&O. No complaints at this time. Pt  O2 weaned down to 6L. Pt spo2 94% on 6L at this time. Pt says she is feeling much better now. VSS. Call light within reach, able to make needs knows, fall precautions in place. Will continue to monitor. .Electronically signed by Vilma Everett RN on 12/28/2020 at 5:44 AM

## 2020-12-28 NOTE — PROGRESS NOTES
Clinical Pharmacy Note  Heparin Dosing       Lab Results   Component Value Date    APTT 151.0 12/28/2020     Lab Results   Component Value Date    HGB 7.8 12/27/2020    HCT 24.8 12/27/2020     12/27/2020    INR 1.89 12/24/2020       Current Infusion Rate: 10.7 mL/hr    Plan:  HOLD for 1 hour  Rate: Restart at 7.1 mL/hr  Next aPTT: 0900  12/28/20    Pharmacy will continue to monitor and adjust based on aPTT results.     Diann Johnson, PharmD  12/28/2020 2:00 AM

## 2020-12-28 NOTE — PROGRESS NOTES
4 Eyes Skin Assessment     NAME:  Ceferino Stockton  YOB: 1960  MEDICAL RECORD NUMBER:  [id number]    The patient is being assess for  Transfer to New Unit    I agree that 2 RN's have performed a thorough Head to Toe Skin Assessment on the patient. ALL assessment sites listed below have been assessed. Areas assessed by both nurses:    Head, Face, Ears, Shoulders, Back, Chest, Arms, Elbows, Hands, Sacrum. Buttock, Coccyx, Ischium and Legs. Feet and Heels        Does the Patient have a Wound?  No noted wound(s)       Hemanth Prevention initiated:  Yes   Wound Care Orders initiated:  No    Pressure Injury (Stage 3,4, Unstageable, DTI, NWPT, and Complex wounds) if present place consult order under [de-identified] No    New and Established Ostomies if present place consult order under : No      Nurse 1 eSignature: Electronically signed by Stephan Baker RN on 12/27/20 at 11:33 PM EST    **SHARE this note so that the co-signing nurse is able to place an eSignature**    Nurse 2 eSignature: Electronically signed by Matthew Alfnoso RN on 12/28/20 at 1:54 AM EST

## 2020-12-28 NOTE — PROGRESS NOTES
Lab called with report re: aptt high. [address] [facility] re: disregard this result, it was drawn right after a bolus dose.  Electronically signed by Vikash Hernandes RN on 12/27/2020 at 7:32 PM

## 2020-12-28 NOTE — PROGRESS NOTES
Hospitalist Progress Note      PCP: Crystal Levi MD    Date of Admission: 12/24/2020    Chief Complaint: Right lower extremity pain    Hospital Course:     Subjective: Patient seen and examined. Patient still with some right lower extremity pain. Work-up with vascular including lower extremity Doppler negative for DVT. I have increased her pain medication to IV Dilaudid      Medications:  Reviewed    Infusion Medications    heparin (PORCINE) Infusion 7.1 mL/hr (12/28/20 0233)     Scheduled Medications    linezolid  600 mg Intravenous Q12H    lidocaine 1 % injection  5 mL Intradermal Once    sodium chloride flush  10 mL Intravenous 2 times per day    midodrine  10 mg Oral TID WC    nystatin   Topical BID    polyethylene glycol  17 g Oral Daily    furosemide  40 mg Intravenous BID    morphine  4 mg Intravenous Once    aspirin  81 mg Oral Daily    [Held by provider] atorvastatin  20 mg Oral Nightly    ferrous sulfate  324 mg Oral TID WC    macitentan  10 mg Oral Daily    magnesium oxide  400 mg Oral Daily    therapeutic multivitamin-minerals  1 tablet Oral Daily    pantoprazole  40 mg Oral BID AC    sildenafil  20 mg Oral TID    cefepime  2 g Intravenous Q12H     PRN Meds: sodium chloride flush, oxyCODONE-acetaminophen, perflutren lipid microspheres, acetaminophen **OR** acetaminophen, bisacodyl, hydrOXYzine      Intake/Output Summary (Last 24 hours) at 12/28/2020 0923  Last data filed at 12/28/2020 0651  Gross per 24 hour   Intake 1080 ml   Output 675 ml   Net 405 ml       Physical Exam Performed:    BP 96/61   Pulse 120   Temp 98.4 °F (36.9 °C) (Oral)   Resp 20   Ht 5' 3\" (1.6 m)   Wt 155 lb 6.8 oz (70.5 kg)   SpO2 93%   BMI 27.53 kg/m²     General appearance: No apparent distress, appears stated age and cooperative. HEENT: Pupils equal, round, and reactive to light. Conjunctivae/corneas clear. Neck: Supple, with full range of motion. No jugular venous distention. Trachea midline. Respiratory:  Normal respiratory effort. Clear to auscultation, bilaterally without Rales/Wheezes/Rhonchi. Cardiovascular: Regular rate and rhythm with normal S1/S2 without murmurs, rubs or gallops. Abdomen: Soft, non-tender, non-distended with normal bowel sounds. Musculoskeletal: Left BKA, +2 pitting edema bilateral lower extremities  Skin: Skin color, texture, turgor normal.  No rashes or lesions. Neurologic:  Neurovascularly intact without any focal sensory/motor deficits. Cranial nerves: II-XII intact, grossly non-focal.  Psychiatric: Alert and oriented, thought content appropriate, normal insight  Capillary Refill: Brisk,< 3 seconds   Peripheral Pulses: +2 palpable, equal bilaterally       Labs:   Recent Labs     12/26/20 0608 12/27/20  0435 12/28/20  0520   WBC 18.3* 15.1* 13.9*   HGB 7.9* 7.8* 8.2*   HCT 25.8* 24.8* 26.5*    231 243     Recent Labs     12/26/20 0608 12/27/20  0435 12/28/20  0520    135* 135*   K 3.7 3.8 3.6    102 103   CO2 20* 18* 19*   BUN 34* 33* 33*   CREATININE 1.5* 1.3* 1.5*   CALCIUM 6.6* 7.5* 8.4   PHOS 2.8 3.0 2.9     No results for input(s): AST, ALT, BILIDIR, BILITOT, ALKPHOS in the last 72 hours. No results for input(s): INR in the last 72 hours. No results for input(s): Wyatt Blood in the last 72 hours. Urinalysis:    No results found for: Abdifatah Don, BACTERIA, RBCUA, BLOODU, SPECGRAV, Elaine [place] [address]    Radiology:  XR CHEST PORTABLE   Final Result   Mild multifocal bilateral pneumonia. CT TIBIA FIBULA RIGHT WO CONTRAST   Final Result   CT right femur:      1. Diffuse osteopenia. No acute osseous abnormality. No aggressive osseous   destruction. 2. 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 Small to moderate right knee   suprapatellar joint effusion. 3. Atherosclerotic calcification of the vasculature. 4. Mild right hip osteoarthrosis. 5. Fluid in the subcutaneous fat of the right lower abdominal and anterior   pelvic wall. Moderate circumferential edema in the subcutaneous fat about   the right thigh/right upper leg. 6. Mild-to-moderate atrophy and fatty degeneration of the right thigh   musculature. CT right tib fib:      1. Diffuse osteopenia. No acute osseous abnormality. No aggressive osseous   destruction. 2. Severe atherosclerotic calcification of the vasculature with probable   occlusive or near occlusive calcification at the level of the popliteal   artery. 3. Moderate edema in the subcutaneous fat about the right leg. 4. Mild-to-moderate atrophy and fatty degeneration of the visualized   musculature. 5. Severe narrowing of the lateral patellofemoral compartment. 6. Small tibiotalar and small to moderate right knee suprapatellar joint   effusions. CT FEMUR RIGHT WO CONTRAST   Final Result   CT right femur:      1. Diffuse osteopenia. No acute osseous abnormality. No aggressive osseous   destruction. 2. 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 Small to moderate right knee   suprapatellar joint effusion. 3. Atherosclerotic calcification of the vasculature. 4. Mild right hip osteoarthrosis. 5. Fluid in the subcutaneous fat of the right lower abdominal and anterior   pelvic wall. Moderate circumferential edema in the subcutaneous fat about   the right thigh/right upper leg. 6. Mild-to-moderate atrophy and fatty degeneration of the right thigh   musculature. CT right tib fib:      1. Diffuse osteopenia. No acute osseous abnormality. No aggressive osseous   destruction. 2. Severe atherosclerotic calcification of the vasculature with probable   occlusive or near occlusive calcification at the level of the popliteal   artery. 3. Moderate edema in the subcutaneous fat about the right leg. 4. Mild-to-moderate atrophy and fatty degeneration of the visualized   musculature. 5. Severe narrowing of the lateral patellofemoral compartment. 6. Small tibiotalar and small to moderate right knee suprapatellar joint   effusions. XR TIBIA FIBULA RIGHT (2 VIEWS)   Final Result   Diffuse demineralized bones without convincing radiographic evidence of   discrete cortical erosion or mario bony destructive changes are seen. If   there is persistent clinical concern for acute osteomyelitis, MRI is   recommended for further evaluation.          VL Extremity Venous Bilateral    (Results Pending)     From [place] Anemia  Continue to monitor    [facility]  Monitor    Pulmonary Artery HTN  Monitor  continue home meds    DVT Prophylaxis: SCD  Diet: DIET CARDIAC;  Dental Soft; Daily Fluid Restriction: 1800 ml  Code Status: Full Code    PT/OT Eval Status: Ordered    Dispo - Ephraim Wilson MD

## 2020-12-28 NOTE — CARE COORDINATION
Sw spoke with patient today and confirmed that she is from [facility], [facility]. She was trying to rest. She gave Sw permission to call her daughter, Jeremy Juarez ([phone]). Sw spoke with daughter who confirmed the plan for patient to return to the facility for continued skilled stay. Sw left message for Francisco Sousa at [facility] to confirm bed status. Patient has negative COVID test from 12/25.     Electronically signed by TYLER Mitchell, LSW, Case Management on 12/28/2020 at 4:30 PM  [facility] [phone]

## 2020-12-29 NOTE — PROGRESS NOTES
Patient is resting in bed. Patient is alert and oriented X3, disoriented to time. Patient is slow to respond and continuously groaning in pain. Pain is mainly in RLE but patient winces whenever any part of her body it touched. PRN pain medication given (see MAR). IV and midline remain in place. Patient remains on 6L nasal cannula. Assessment complete. All patient needs are met at this time. Fall precautions are In place. Call light is in reach. Will continue to monitor.    Electronically signed by John Espitia RN on 12/29/2020 at 12:59 AM

## 2020-12-29 NOTE — PROGRESS NOTES
Patient A&O. Patient resting in bed at this time. Call light within reach. Will continue to monitor and reassess.  Electronically signed by Gregor Lehman RN on 12/29/2020

## 2020-12-29 NOTE — PROGRESS NOTES
Hospitalist Progress Note      PCP: Merry Rojas MD    Date of Admission: 12/24/2020    Chief Complaint: Right lower extremity pain    Hospital Course:     Subjective: Patient seen and examined. CT of the abdomen pelvis performed today showing diffuse anasarca. Patient not really diuresing well on the Lasix twice daily so I will transition her over to a Lasix drip. Will need to keep an eye on her creatinine levels. I consulted pulmonology along with cardiology in regards to her acute on chronic diastolic heart failure exacerbation along with her interstitial lung disease/pulmonary arterial hypertension. I am unsure what else we can offer the patient for her pulmonary arterial hypertension. As far as the heart failure goes I think transitioning to Lasix drip to help get off some this fluid may help her. My concern is that this will further diminish her overall renal function.     Medications:  Reviewed    Infusion Medications     Scheduled Medications    cefepime  1 g Intravenous Q12H    linezolid  600 mg Intravenous Q12H    lidocaine 1 % injection  5 mL Intradermal Once    sodium chloride flush  10 mL Intravenous 2 times per day    midodrine  10 mg Oral TID WC    nystatin   Topical BID    polyethylene glycol  17 g Oral Daily    furosemide  40 mg Intravenous BID    morphine  4 mg Intravenous Once    aspirin  81 mg Oral Daily    [Held by provider] atorvastatin  20 mg Oral Nightly    ferrous sulfate  324 mg Oral TID WC    macitentan  10 mg Oral Daily    magnesium oxide  400 mg Oral Daily    therapeutic multivitamin-minerals  1 tablet Oral Daily    pantoprazole  40 mg Oral BID AC    sildenafil  20 mg Oral TID     PRN Meds: HYDROmorphone **OR** HYDROmorphone, ondansetron, sodium chloride flush, oxyCODONE-acetaminophen, perflutren lipid microspheres, acetaminophen **OR** acetaminophen, bisacodyl, hydrOXYzine      Intake/Output Summary (Last 24 hours) at 12/29/2020 9314 Last data filed at 12/29/2020 0655  Gross per 24 hour   Intake 240 ml   Output 575 ml   Net -335 ml       Physical Exam Performed:    /66   Pulse 118   Temp 98.5 °F (36.9 °C) (Oral)   Resp 15   Ht 5' 3\" (1.6 m)   Wt 156 lb 1.4 oz (70.8 kg)   SpO2 97%   BMI 27.65 kg/m²     General appearance: No apparent distress, appears stated age and cooperative. HEENT: Pupils equal, round, and reactive to light. Conjunctivae/corneas clear. Neck: Supple, with full range of motion. No jugular venous distention. Trachea midline. Respiratory:  Normal respiratory effort. Cardiovascular: Regular rate and rhythm with normal S1/S2 without murmurs, rubs or gallops. Abdomen: Soft, non-tender, non-distended with normal bowel sounds. Musculoskeletal: Left BKA, +2 pitting edema bilateral lower extremities  Skin: Skin color, texture, turgor normal.  No rashes or lesions. Neurologic:  Neurovascularly intact without any focal sensory/motor deficits. Cranial nerves: II-XII intact, grossly non-focal.  Psychiatric: Alert and oriented, thought content appropriate, normal insight  Capillary Refill: Brisk,< 3 seconds   Peripheral Pulses: +2 palpable, equal bilaterally       Labs:   Recent Labs     12/27/20  0435 12/28/20  0520 12/29/20  0635   WBC 15.1* 13.9* 17.3*   HGB 7.8* 8.2* 9.1*   HCT 24.8* 26.5* 28.8*    243 285     Recent Labs     12/27/20  0435 12/28/20  0520 12/29/20  0635   * 135* 136   K 3.8 3.6 4.0    103 102   CO2 18* 19* 19*   BUN 33* 33* 36*   CREATININE 1.3* 1.5* 1.5*   CALCIUM 7.5* 8.4 8.5   PHOS 3.0 2.9 3.1     No results for input(s): AST, ALT, BILIDIR, BILITOT, ALKPHOS in the last 72 hours. No results for input(s): INR in the last 72 hours. No results for input(s): Larinda Buckley in the last 72 hours.     Urinalysis:    No results found for: Guyann , BACTERIA, RBCUA, BLOODU, SPECGRAV, [address]    Radiology:  VL Extremity Venous Bilateral         XR CHEST PORTABLE   Final Result Mild multifocal bilateral pneumonia. CT TIBIA FIBULA RIGHT WO CONTRAST   Final Result   CT right femur:      1. Diffuse osteopenia. No acute osseous abnormality. No aggressive osseous   destruction. 2. 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 Small to moderate right knee   suprapatellar joint effusion. 3. Atherosclerotic calcification of the vasculature. 4. Mild right hip osteoarthrosis. 5. Fluid in the subcutaneous fat of the right lower abdominal and anterior   pelvic wall. Moderate circumferential edema in the subcutaneous fat about   the right thigh/right upper leg. 6. Mild-to-moderate atrophy and fatty degeneration of the right thigh   musculature. CT right tib fib:      1. Diffuse osteopenia. No acute osseous abnormality. No aggressive osseous   destruction. 2. Severe atherosclerotic calcification of the vasculature with probable   occlusive or near occlusive calcification at the level of the popliteal   artery. 3. Moderate edema in the subcutaneous fat about the right leg. 4. Mild-to-moderate atrophy and fatty degeneration of the visualized   musculature. 5. Severe narrowing of the lateral patellofemoral compartment. 6. Small tibiotalar and small to moderate right knee suprapatellar joint   effusions. CT FEMUR RIGHT WO CONTRAST   Final Result   CT right femur:      1. Diffuse osteopenia. No acute osseous abnormality. No aggressive osseous   destruction. 2. 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 Small to moderate right knee   suprapatellar joint effusion. 3. Atherosclerotic calcification of the vasculature. 4. Mild right hip osteoarthrosis. 5. Fluid in the subcutaneous fat of the right lower abdominal and anterior   pelvic wall.   Moderate circumferential edema in the subcutaneous fat about the right thigh/right upper leg. 6. Mild-to-moderate atrophy and fatty degeneration of the right thigh   musculature. CT right tib fib:      1. Diffuse osteopenia. No acute osseous abnormality. No aggressive osseous   destruction. 2. Severe atherosclerotic calcification of the vasculature with probable   occlusive or near occlusive calcification at the level of the popliteal   artery. 3. Moderate edema in the subcutaneous fat about the right leg. 4. Mild-to-moderate atrophy and fatty degeneration of the visualized   musculature. 5. Severe narrowing of the lateral patellofemoral compartment. 6. Small tibiotalar and small to moderate right knee suprapatellar joint   effusions. XR TIBIA FIBULA RIGHT (2 VIEWS)   Final Result   Diffuse demineralized bones without convincing radiographic evidence of   discrete cortical erosion or mario bony destructive changes are seen. If   there is persistent clinical concern for acute osteomyelitis, MRI is   recommended for further evaluation.            From [facility] Course: The patient is a(n) 64year old female well known to our service for ILD, CREST, PH, CKD, chronic respiratory failure with hypoxia who was admitted for acute on chronic respiratory failure with hypoxia 2/2 acute on chronic diastolic CHF exacerbation and gram negative pneumonia. Started on IV lasix and antibiotics. Course complicated by hypotension with JESSICA on CKD requiring trial of dobutamine. RHC done showing wedge of 17 and preserved cardiac output. Continued with diuretics and dobutamine weaned off. Hospital course then complicated by acute blood loss anemia 2/2 GIB requiring PRBC transfusion. GI consulted and EGD done X 2 showing significant amount of blood in the stomach with gastroduodenitis and continued on PPI. Hb stabilized. Creatinine stabilized and resumed on diuretics with stable renal function. PT/OT consulted, and recommended SNF. Patient has previously been resistant to placement, however given her multiple medical issues, recurrent hospitalizations, patient agreeable to snf placement at this time. Palliative care consulted and broached the idea of hospice, however patient not ready to pursue that level of care at this time. Patient currently stable for d/c on 2-3 L NC o2 at baseline, renal function stable. Plan for follow up with PCP, pulmonology, nephrology, cardiology.          Assessment/Plan:    Right lower extremity cellulitis  CT with lower extremity edema to the lower abdomen  Infectious disease following  Continue IV antibiotics  DC hep drip with neg doppler and hx/o GI bleed    Chronic respiratory failure with hypoxia   secondary to her pulmonary arterial hypertension  Continue with home dose of oxygen    Acute renal failure  Creatinine trending down with IV Lasix  Continue to monitor  Transition to Lasix drip    Pulmonary hypertension with cor pulmonale  Continue IV Lasix  Consult cardiology   Continue revatio    Sepsis, POA  Secondary to cellulitis Blood cultures with no growth to date    Anemia  Continue to monitor    CREST  Monitor    Pulmonary Artery HTN  Monitor  continue home meds    DVT Prophylaxis: SCD  Diet: DIET CARDIAC;  Dental Soft; Daily Fluid Restriction: 1800 ml  Code Status: Full Code    PT/OT Eval Status: Ordered    Dispo - Jana Wren MD

## 2020-12-29 NOTE — PROGRESS NOTES
Physical Therapy    Ailyn Lookout  12/29/2020  Nursing request hold of therapies today. Will check on tomorrow.   Electronically signed by Krystyna Eller PT on 12/29/2020 at 3:45 PM

## 2020-12-29 NOTE — PROGRESS NOTES
Patient's BP is 112/71 after receiving her midodrine. Dr. Bud Dumont aware and notified. Ok to hold IV lasix and give PRN pain medication per Bud Dumont.  Electronically signed by Compa Prieto RN on 12/29/2020

## 2020-12-29 NOTE — PROGRESS NOTES
Occupational Therapy    OT order received. RN advised to defer therapy this date d/t current medical status. Will follow up as pt condition permit.     Electronically signed by Justice Spivey OT on 12/29/2020 at 2:40 PM

## 2020-12-29 NOTE — CONSULTS
PATIENT IS SEEN AT THE REQUEST OF DR. Nessa Sykes for CREST with PAH    CONSULTING PHYSICIAN: Nessa Sykes    HISTORY OF PRESENT ILLNESS:  This is a 64 y.o. female who presented to the ED on 12/24 with a CC of leg pain. Per ED notes she has pain in the leg that started the day prior to presentation. She was admitted for sepsis and PAD. She really won't talk to me or give me a good history about her breathing, specifically her PH. I asked if she knew what medications she is on for her PH, all she could tell me was nebulizers. She is in so much pain she won't offer anything else. Established Pulmonologist:  [facility]     PAST MEDICAL HISTORY:  Past Medical History:   Diagnosis Date    Abnormal uterine bleeding     Cellulitis 2/2013    left foot    Hypertension     Peripheral vascular disease (HCC)     Scleroderma ([address]  Pulmonary arterial Hypertension  COPD  ILD         PAST SURGICAL HISTORY:  Past Surgical History:   Procedure Laterality Date    AMPUTATION  2/2013    left foot 5th toe    HYSTERECTOMY      total with both tubes and ovaries       FAMILY HISTORY:  Asthma, Cancer (unknown)    SOCIAL HISTORY:   reports that she has been smoking cigarettes. She has a 5.00 pack-year smoking history.  She has never used smokeless tobacco. Cocaine use in the past, marijuana use     Scheduled Meds:   cefepime  1 g Intravenous Q12H    linezolid  600 mg Intravenous Q12H    lidocaine 1 % injection  5 mL Intradermal Once    sodium chloride flush  10 mL Intravenous 2 times per day    midodrine  10 mg Oral TID WC    nystatin   Topical BID    polyethylene glycol  17 g Oral Daily    furosemide  40 mg Intravenous BID    morphine  4 mg Intravenous Once    aspirin  81 mg Oral Daily    [Held by provider] atorvastatin  20 mg Oral Nightly    ferrous sulfate  324 mg Oral TID WC    macitentan  10 mg Oral Daily    magnesium oxide  400 mg Oral Daily    therapeutic multivitamin-minerals  1 tablet Oral Daily  pantoprazole  40 mg Oral BID AC    sildenafil  20 mg Oral TID       Continuous Infusions:      PRN Meds:  HYDROmorphone **OR** HYDROmorphone, ondansetron, sodium chloride flush, oxyCODONE-acetaminophen, perflutren lipid microspheres, acetaminophen **OR** acetaminophen, bisacodyl, hydrOXYzine    ALLERGIES:  Patient has No Known Allergies. REVIEW OF SYSTEMS: HARD TO OBTAIN  Constitutional: Negative for fever or chills  HENT: Negative for sore throat  Eyes: Negative for redness   Respiratory: SOB  Cardiovascular: Negative for chest pain  Gastrointestinal: Negative for vomiting, diarrhea   Genitourinary: Negative for hematuria, negative for dysuria  Musculoskeletal: Negative for arthralgias   Skin: Negative for rash  Neurological: Negative for syncope  Hematological: Negative for adenopathy  Extremities:  Pain her leg    PHYSICAL EXAM:  Blood pressure 110/66, pulse 109, temperature 98.5 °F (36.9 °C), temperature source Oral, resp. rate 15, height 5' 3\" (1.6 m), weight 156 lb 1.4 oz (70.8 kg), SpO2 93 %.'  Gen: Chronically ill, allopecia, distressed due to pain   Eyes: PERRL. No sclera icterus. No conjunctival injection. ENT: No discharge. Pharynx clear. Neck: Trachea midline. No obvious mass. Resp: No accessory muscle use. No crackles. No wheezes. No rhonchi. CV: Regular rate. Regular rhythm. No murmur or rub. GI: Non-tender. Non-distended. No hernia. BS present. Skin: Diffuse skin thickening   Lymph: No cervical LAD. No supraclavicular LAD. M/S: No cyanosis. No joint deformity. Neuro: Awake. Alert. Moves all four extremities.    EXT:   no edema, right leg wrapped, left AKA    LABS:  CBC:   Recent Labs     12/27/20 0435 12/28/20 0520 12/29/20  0635   WBC 15.1* 13.9* 17.3*   HGB 7.8* 8.2* 9.1*   HCT 24.8* 26.5* 28.8*   MCV 90.4 92.4 90.5    243 285     BMP:   Recent Labs     12/27/20 0435 12/28/20  0520 12/29/20  0635   * 135* 136   K 3.8 3.6 4.0    103 102   CO2 18* 19* 19* PHOS 3.0 2.9 3.1   BUN 33* 33* 36*   CREATININE 1.3* 1.5* 1.5*     LIVER PROFILE: No results for input(s): AST, ALT, LIPASE, BILIDIR, BILITOT, ALKPHOS in the last 72 hours. Invalid input(s): AMYLASE,  ALB  PT/INR: No results for input(s): PROTIME, INR in the last 72 hours. APTT:   Recent Labs     20  1856 20  0110 20  1229   APTT >248.0* 151.0* 56.4*     UA:No results for input(s): NITRITE, COLORU, PHUR, LABCAST, WBCUA, RBCUA, MUCUS, TRICHOMONAS, YEAST, BACTERIA, CLARITYU, SPECGRAV, LEUKOCYTESUR, UROBILINOGEN, BILIRUBINUR, BLOODU, GLUCOSEU, AMORPHOUS in the last 72 hours. Invalid input(s): KETONESU  No results for input(s): PHART, LIG4CUO, PO2ART in the last 72 hours. Cultures:       PFTs:  Mild restriction     2020  RHC    PA - 92/37 (56)  PW -  (17)  RV - 92/9-21  RA -  (18)    Sats    PA - 60%  RA - 61%  Ao - 94%    CO - 5.5 L/min  PVR - 7.1 wood units       VB.44    Chest X-ray:  Chest imaging was reviewed by me and showed bilateral airspace disease with cardiomegaly     Chest CT 10/2020  Pulmonary edema, pleural effusions and flank edema in conjunction with cardiomegaly and pericardial effusion most consistent with combined left and right heart failure. Dilated pulmonary artery consistent with pulmonary hypertension. Gallstones. I reviewed all the above labs and studies pertaining to this visit. ASSESSMENT:  · Acute on Chronic Hypoxic Respiratory Failure  · Suffers from [facility] felt to be related to scleroderma and pulmonary venous occlusive disease but also has emphysema and ILD  · Right ventricular failure   · Severe PAD  · Cellulitis  · Tobacco Abuse    PLAN:  Titrate oxygen for saturations greater than or equal to 88%  · Check BNP, Sed rate and CRP. Increasing BNP is marker of poor prognosis.     · Hold off on resuming cellcept due to possible underlying infection at this time · Continue with macitentan and sildenafil. Not a candidate for any other therapy  · Diuresis as is   · Antibiotics  · Follow cultures  · DVT prophylaxis with lovenox     Was established with Dr Amy Drew at [facility] but did not want to continue there and is now seen at \[facility]. per chart review    Had been treated with macitentan and veletri. Side effects to veletri. Supposed to be on macitentan (opsumit) and sidenafil (revatio) at home but stopped opsumit due to SOB (she stopped this on her own). It was recommended to be resumed at last office visit     [address] secondary to Scleroderma carries a very poor prognosis not to mention additional factors such as ILD, emphysema, etc. She is likely out of options for her PAH. This degree of PAH is usually treated with continuous infusion of vasodilators via a pump, clearly she is not a candidate for that     Palliative care.         She should not be going to different hospital systems with her complex lung disease as we are limited with what we can offer her here    DO Marga Farmer Pulmonary

## 2020-12-29 NOTE — CARE COORDINATION
Attempted to see pt. Pt currently having pain. BP has been low. RN to check with MD re:pain meds. RN to notify me when pt able to be medicated.  Randy Shahid, MSN, RN, Bry Salazar

## 2020-12-29 NOTE — CARE COORDINATION
Vitreous opacities/debris are not visually significant. [facility] Wound Ostomy Continence Nurse  Consult Note       NAME:  Gurjit Jiang  MEDICAL RECORD NUMBER:  [id number]  AGE: 64 y.o. GENDER: female  : 1960  TODAY'S DATE:  2020    Subjective   Reason for WOCN Evaluation and Assessment: stage 2; pt has MASD   Gurjit Jiang is a 64 y.o. female referred by:   [] Physician  [x] Nursing  [] Other:     Wound Identification:  Wound Type: MASD  Contributing Factors: moisture, drainage from groin ?right labia    Wound History: noted yesterday; pt noted to have non-blanchable erythema on admit  Current Wound Care Treatment:  Foam border    Patient Goal of Care:  [x] Wound Healing  [] Odor Control  [] Palliative Care  [] Pain Control   [] Other:         PAST MEDICAL HISTORY        Diagnosis Date    Abnormal uterine bleeding     Cellulitis 2013    left foot    Hypertension     [facility]     Scleroderma ([facility]        PAST SURGICAL HISTORY    Past Surgical History:   Procedure Laterality Date    AMPUTATION  2013    left foot 5th toe    HYSTERECTOMY      total with both tubes and ovaries       FAMILY HISTORY    No family history on file. SOCIAL HISTORY    Social History     Tobacco Use    Smoking status: Current Every Day Smoker     Packs/day: 0.50     Years: 10.00     Pack years: 5.00     Types: Cigarettes     Last attempt to quit: 2013     Years since quittin.9    Smokeless tobacco: Never Used   Substance Use Topics    Alcohol use: Yes     Comment: drinks beer - 4-6 cans per day - quit     Drug use: No       ALLERGIES    No Known Allergies    MEDICATIONS    No current facility-administered medications on file prior to encounter.       Current Outpatient Medications on File Prior to Encounter   Medication Sig Dispense Refill    torsemide (DEMADEX) 20 MG tablet Take 40 mg by mouth 2 times daily      sildenafil (REVATIO) 20 MG tablet Take 20 mg by mouth 3 times daily  sodium chloride (OCEAN, BABY AYR) 0.65 % nasal spray 1 spray by Nasal route as needed for Congestion      macitentan 10 MG TABS Take 10 mg by mouth daily      allopurinol (ZYLOPRIM) 100 MG tablet Take 200 mg by mouth daily      midodrine (PROAMATINE) 10 MG tablet Take 10 mg by mouth 3 times daily      bisacodyl (DULCOLAX) 10 MG suppository Place 10 mg rectally daily as needed for Constipation       ferrous sulfate (FE TABS 325) 325 (65 Fe) MG EC tablet Take 325 mg by mouth 3 times daily (with meals)      magnesium oxide (MAG-OX) 400 MG tablet Take 400 mg by mouth daily      polyethylene glycol (GLYCOLAX) 17 g packet Take 17 g by mouth daily as needed for Constipation      nystatin (MYCOSTATIN) 955099 UNIT/GM powder Apply topically 2 times daily Apply topically under breasts      magnesium hydroxide (MILK OF MAGNESIA) 400 MG/5ML suspension Take by mouth daily as needed for Constipation      omeprazole (PRILOSEC) 40 MG delayed release capsule Take 40 mg by mouth 2 times daily      oxyCODONE-acetaminophen (PERCOCET) 5-325 MG per tablet Take 1 tablet by mouth every 4 hours as needed for Pain.  potassium chloride (KLOR-CON M) 20 MEQ extended release tablet Take 20 mEq by mouth daily      omeprazole (PRILOSEC) 40 MG delayed release capsule Take 40 mg by mouth 2 times daily      Multiple Vitamins-Minerals (THERAPEUTIC MULTIVITAMIN-MINERALS) tablet Take 1 tablet by mouth daily      vitamin D3 (CHOLECALCIFEROL) 25 MCG (1000 UT) TABS tablet Take 2,000 Units by mouth daily       atorvastatin (LIPITOR) 20 MG tablet Take 20 mg by mouth nightly      aspirin 81 MG chewable tablet Take 81 mg by mouth daily       vitamin C (ASCORBIC ACID) 500 MG tablet Take 500 mg by mouth daily      triamcinolone (KENALOG) 0.1 % cream Apply 1 each topically 2 times daily Apply topically 2 times daily.       hydrOXYzine (ATARAX) 25 MG tablet Take 25 mg by mouth every 8 hours as needed for Anxiety  acetaminophen (TYLENOL) 325 MG tablet Take 650 mg by mouth every 6 hours as needed for Pain or Fever          Objective    /70   Pulse 109   Temp 97.4 °F (36.3 °C) (Oral)   Resp 15   Ht 5' 3\" (1.6 m)   Wt 156 lb 1.4 oz (70.8 kg)   SpO2 93%   BMI 27.65 kg/m²     LABS:  WBC:    Lab Results   Component Value Date    WBC 17.3 12/29/2020     H/H:    Lab Results   Component Value Date    HGB 9.1 12/29/2020    HCT 28.8 12/29/2020     PTT:    Lab Results   Component Value Date    APTT 56.4 12/28/2020   [APTT}  PT/INR:    Lab Results   Component Value Date    PROTIME 22.1 12/24/2020    INR 1.89 12/24/2020     HgBA1c:  No results found for: LABA1C    Assessment   Hemanth Risk Score: Hemanth Scale Score: 12    Patient Active Problem List   Diagnosis Code    [facility]omyel[facility] of [facility], left[facility] acute [facility] M86.172    Cellulitis and abscess of foot L03.119, L02.619    Chest pain R07.9    Acute on chronic respiratory failure (HCC) J96.20    CREST syndrome (HCC) M34.1    Pulmonary hypertension (HCC) I27.20    Pulmonary arterial hypertension (HCC) I27.21    Tobacco use Z72.0    Cellulitis L03.90    Centrilobular emphysema ([facility]) J43.2    ILD (interstitial lung disease) ([facility] J84.9       Measurements:  Wound 12/28/20 Coccyx (Active)   Wound Image   12/29/20 1100   Wound Etiology Other 12/29/20 1100   Dressing Status Other (Comment) 12/28/20 1947   Dressing/Treatment Silicone border 39/74/40 1947   Wound Assessment Pink/red 12/28/20 1947   Drainage Amount None 12/28/20 1947   Number of days: 0      Pt has been medicated. Needed encouragement to turn and reposition. Right side of labia is edematous and painful. Has yellow drainage from groin area that extends posteriorly to perirectal/buttock area. Pt does not have stage 2; has MASD. Response to treatment:  Poorly tolerated by patient.          Plan   Plan of Care: Wound 12/28/20 Coccyx-Dressing/Treatment: Silicone border(mepilex)  -add Calmoseptine to buttocks - encourage repositioning    Specialty Bed Required : Yes   [x] Low Air Loss   [] Pressure Redistribution  [] Fluid Immersion  [] Bariatric  [] Total Pressure Relief  [] Other:     Current Diet: DIET CARDIAC;  Dental Soft; Daily Fluid Restriction: 1800 ml  Dietician consult:  No    Discharge Plan:  Placement for patient upon discharge: skilled nursing    Patient appropriate for Outpatient [address]: No    Referrals:  []   [] [facility]  [] Supplies  [] Other    Patient/Caregiver Teaching:  Level of patient/caregiver understanding able to:   [] Indicates understanding       [x] Needs reinforcement  [] Unsuccessful      [] Verbal Understanding  [] Demonstrated understanding       [] No evidence of learning  [] Refused teaching         [] N/A       Electronically signed by Srinivas Dhillon on 12/29/2020 at 1:36 PM

## 2020-12-29 NOTE — PLAN OF CARE
Problem: Falls - Risk of:  Goal: Will remain free from falls  Description: Will remain free from falls  Outcome: Ongoing  Note: Fall risk assessment completed. Fall precautions in place. Call light within reach. Pt educated on calling for assistance before getting up. Walkway free of clutter. Will continue to monitor. Goal: Absence of physical injury  Description: Absence of physical injury  Outcome: Ongoing  Note: Pt is free of injury. No injury noted. Fall precautions in place. Call light within reach. Will monitor. Problem: Skin Integrity:  Goal: Will show no infection signs and symptoms  Description: Will show no infection signs and symptoms  Outcome: Ongoing  Note: Pt is free of signs and symptoms of infection. Incision and dressing are clean, dry and intact. Vital signs stable. Will monitor. Goal: Absence of new skin breakdown  Description: Absence of new skin breakdown  Outcome: Ongoing  Note: Patient will be absent of new skin breakdown    Goal: Risk for impaired skin integrity will decrease  Description: Risk for impaired skin integrity will decrease  Outcome: Ongoing  Note: Patients skin integrity will improve       Problem: Pain:  Goal: Pain level will decrease  Description: Pain level will decrease  Outcome: Ongoing  Goal: Control of acute pain  Description: Control of acute pain  Outcome: Ongoing  Note: Pt assessed for pain. Pt in pain and assessed with 0-10 pain rating scale. Pt given prescribed analgesic for pain. (See eMar) Pt satisfied with pain relief thus far. Will reassess and continue to monitor.      Goal: Control of chronic pain  Description: Control of chronic pain  Outcome: Ongoing     Problem: Discharge Planning:  Goal: Discharged to appropriate level of care  Description: Discharged to appropriate level of care  Outcome: Ongoing  Note: Patient will be discharged to appropriate level of care       Problem: Fluid Volume: Goal: Ability to achieve a balanced intake and output will improve  Description: Ability to achieve a balanced intake and output will improve  Outcome: Ongoing  Note: Patients intake and output will remain balanced       Problem: Physical Regulation:  Goal: Ability to maintain clinical measurements within normal limits will improve  Description: Ability to maintain clinical measurements within normal limits will improve  Outcome: Ongoing  Goal: Will show no signs and symptoms of electrolyte imbalance  Description: Will show no signs and symptoms of electrolyte imbalance  Outcome: Ongoing  Note: Will continue to monitor patients labs       Problem: Breathing Pattern - Ineffective:  Goal: Ability to achieve and maintain a regular respiratory rate will improve  Description: Ability to achieve and maintain a regular respiratory rate will improve  Outcome: Ongoing  Note: Patients respiratory status will improve       Problem: Fluid Volume - Imbalance:  Goal: Absence of imbalanced fluid volume signs and symptoms  Description: Absence of imbalanced fluid volume signs and symptoms  Outcome: Ongoing  Note: Monitoring patient for fluid balance abnormalities       Problem: Activity:  Goal: Ability to tolerate increased activity will improve  Description: Ability to tolerate increased activity will improve  Outcome: Ongoing  Note: Patient activity will improve       Problem: Mobility - Impaired:  Goal: Mobility will improve  Description: Mobility will improve  Outcome: Ongoing     Problem:  Bowel/Gastric:  Goal: Control of bowel function will improve  Description: Control of bowel function will improve  Outcome: Ongoing  Goal: Ability to achieve a regular elimination pattern will improve  Description: Ability to achieve a regular elimination pattern will improve  Outcome: Ongoing     Problem: Nutritional:  Goal: Ability to follow a diet with enough fiber (20 to 30 grams) for normal bowel function will improve Description: Ability to follow a diet with enough fiber (20 to 30 grams) for normal bowel function will improve  Outcome: Ongoing     Problem: SAFETY  Goal: Free from accidental physical injury  Outcome: Ongoing  Note: Pt is free of injury. No injury noted. Fall precautions in place. Call light within reach. Will monitor. Goal: Free from intentional harm  Outcome: Ongoing  Note: Pt is free of intentional harm. No intentions noted. Will monitor. Problem: DAILY CARE  Goal: Daily care needs are met  Outcome: Ongoing     Problem: PAIN  Goal: Patient's pain/discomfort is manageable  Outcome: Ongoing     Problem: SKIN INTEGRITY  Goal: Skin integrity is maintained or improved  Outcome: Ongoing     Problem: KNOWLEDGE DEFICIT  Goal: Patient/S.O. demonstrates understanding of disease process, treatment plan, medications, and discharge instructions.   Outcome: Ongoing     Problem: DISCHARGE BARRIERS  Goal: Patient's continuum of care needs are met  Outcome: Ongoing   Electronically signed by Alex Dupont RN on 12/29/2020 at 12:57 AM

## 2020-12-29 NOTE — PROGRESS NOTES
Surgery Consult Note   Cordie Cheadle, PA-C   Pt Name: Edinson Fernández   MRN: [id number]   [place]: 1960   Date of evaluation: 12/28/2020   Primary Care Physician: Lavinia Andersen MD   Chief Complaint: RLE pain    IMPRESSIONS:   1. RLE cellulitis not very impressive today/I see no redness streaks even the edema is better  but she is tender in most areas  2. S/P LLE BKA nontender well-healed  3. HX: RLE femoral artery to mid anterior tibial bypass graft bypass is still working strong Doppler in the dorsalis pedis  4. Leukocytosis : 18.3-->15.1-->13.9 >17   5. Tachycardia:   6. has PAH (pulmonary artery hypertension) (HCC) and Tobacco use on their pertinent problem list.   7. ARF, Scleroderma/CREST syndrome, Pulmonary HTN  8. Marked right labial edema and tenderness open wound small but draining right groin abdomen seems tender and distended as well I would recommend getting a CT of the abdomen and pelvis to make sure were not missing an abscess, rectal exam not done  9. Difficult to get meaningful history from her because of constant moaning   PLANS:   1. Elevate RLE as tolerated  2. 6\"ACE wrap RLE  3. Monitor and control pain  4.  Antibiotics per ID   SUBJECTIVE:   History of Chief Complaint: Jaimee Dean is a 64 y.o. female who presents with pain in her RLE. She present to the hospital from an ECF on 12/24/20. At that time she was having increased pain and erythema of her RLE. In the ER she was found to have cellulitis and a CT scan was performed. The CT scan showed her to have atherosclerotic calcification on the vasculature, fluid in the subcutaneous fat of the right lower abdominal and anterior pelvic wall with moderate circumferential edema in the subcutaneous fat around the right thigh and right upper leg. ID has been following her and treating the cellulitis with antibiotics. She does have a significant vascular history and has been treated by Dr. Florence Noble. Of note she has a RLE femoral artery to mid anterior tibial bypass graft. She denies having any other pain at this time. Denies any CP, SsOB, cough, lightheadedness or dizziness. Past Medical History   Reviewed has a past medical history of Abnormal uterine bleeding, Cellulitis, Hypertension, Peripheral vascular disease ([address] and Scleroderma ([address] Past Surgical History   Reviewed has a past surgical history that includes amputation (2/2013) and Hysterectomy.    Medications    Home Medications                                                                                                                                                                                                                                                   Cosigned by: Eric Vargas MD at 12/28/2020 4:43 PM               ED to Hosp-Admission (Current) on 12/24/2020

## 2020-12-29 NOTE — PLAN OF CARE
Problem: Falls - Risk of:  Goal: Will remain free from falls  Description: Will remain free from falls  12/29/2020 1126 by Mushtaq Cao RN  Outcome: Ongoing     Problem: Falls - Risk of:  Goal: Absence of physical injury  Description: Absence of physical injury  12/29/2020 1126 by Mushtaq Cao RN  Outcome: Ongoing     Problem: Skin Integrity:  Goal: Will show no infection signs and symptoms  Description: Will show no infection signs and symptoms  12/29/2020 1126 by Mushtaq Cao RN  Outcome: Ongoing     Problem: Skin Integrity:  Goal: Absence of new skin breakdown  Description: Absence of new skin breakdown  12/29/2020 1126 by Mushtaq Cao RN  Outcome: Ongoing     Problem: Skin Integrity:  Goal: Risk for impaired skin integrity will decrease  Description: Risk for impaired skin integrity will decrease  12/29/2020 1126 by Mushtaq Cao RN  Outcome: Ongoing     Problem: Pain:  Goal: Pain level will decrease  Description: Pain level will decrease  12/29/2020 1126 by Mushtaq Cao RN  Outcome: Ongoing     Problem: Pain:  Goal: Control of acute pain  Description: Control of acute pain  12/29/2020 1126 by Mushtaq Cao RN  Outcome: Ongoing     Problem: Pain:  Goal: Control of chronic pain  Description: Control of chronic pain  12/29/2020 1126 by Mushtaq Cao RN  Outcome: Ongoing     Problem: Discharge Planning:  Goal: Discharged to appropriate level of care  Description: Discharged to appropriate level of care  12/29/2020 1126 by Mushtaq Cao RN  Outcome: Ongoing     Problem: Fluid Volume:  Goal: Ability to achieve a balanced intake and output will improve  Description: Ability to achieve a balanced intake and output will improve  12/29/2020 1126 by Mushtaq Cao RN  Outcome: Ongoing     Problem: Physical Regulation:  Goal: Ability to maintain clinical measurements within normal limits will improve  Description: Ability to maintain clinical measurements within normal limits will improve 12/29/2020 1126 by Juan José Freeman RN  Outcome: Ongoing     Problem: Physical Regulation:  Goal: Will show no signs and symptoms of electrolyte imbalance  Description: Will show no signs and symptoms of electrolyte imbalance  12/29/2020 1126 by Juan José Freeman RN  Outcome: Ongoing     Problem: Breathing Pattern - Ineffective:  Goal: Ability to achieve and maintain a regular respiratory rate will improve  Description: Ability to achieve and maintain a regular respiratory rate will improve  12/29/2020 1126 by Juan José Freeman RN  Outcome: Ongoing     Problem: Fluid Volume - Imbalance:  Goal: Absence of imbalanced fluid volume signs and symptoms  Description: Absence of imbalanced fluid volume signs and symptoms  12/29/2020 1126 by Juan José Freeman RN  Outcome: Ongoing     Problem: Activity:  Goal: Ability to tolerate increased activity will improve  Description: Ability to tolerate increased activity will improve  12/29/2020 1126 by Juan José Freeman RN  Outcome: Ongoing     Problem: Mobility - Impaired:  Goal: Mobility will improve  Description: Mobility will improve  12/29/2020 1126 by Juan José Freeman RN  Outcome: Ongoing     Problem: Bowel/Gastric:  Goal: Control of bowel function will improve  Description: Control of bowel function will improve  12/29/2020 1126 by Juan José Freeman RN  Outcome: Ongoing     Problem:  Bowel/Gastric:  Goal: Ability to achieve a regular elimination pattern will improve  Description: Ability to achieve a regular elimination pattern will improve  12/29/2020 1126 by Juan oJsé Freeman RN  Outcome: Ongoing     Problem: Nutritional:  Goal: Ability to follow a diet with enough fiber (20 to 30 grams) for normal bowel function will improve  Description: Ability to follow a diet with enough fiber (20 to 30 grams) for normal bowel function will improve  12/29/2020 1126 by Juan José Freeman RN  Outcome: Ongoing     Problem: SAFETY  Goal: Free from accidental physical injury  12/29/2020 1126 by Juan José Freeman RN

## 2020-12-29 NOTE — CONSULTS
PALLIATIVE MEDICINE CONSULTATION     Patient name:Patricia Greene   MQS:[id number]    :1960  Room/Bed:S5X-[id number]   LOS: 5 days         Date of consult:2020    Consult Information  Palliative Medicine Consult performed by: Amna Ya    Requested by: Amanda Thompson MD  Reason for consult: Goals of Care, code status    Number of admissions past 12 months:     ASSESSMENT/RECOMMENDATIONS     64 y.o. female with cellulitis and PAD      Symptom Management:  1. Dyspnea- stable pt is on 2-3L oxygen at baseline  2. Leg pain- well controlled on current regimen issues maintain BP with narcotic use  3. Goals of Care- pt was previously in home based palliative care with [facility] she signed out of their services. Pt reports that she had ACP documents but doesn't know what happened to them. She is interested in again doing ACP documents. We discussed CODE STATUS she wants to think about her options and stay a full code at this time. Patient/Family Goals of Care :    Pt considering CODE STATUS options. She wants ACP documents     Disposition/Discharge Plan:   Pending    Advance Directives:  · Surrogate Decision Maker: Pt to designate, currently self  · Code status:  Full Code    Case discussed with: Floor RN and patient  Thank you for allowing us to participate in the care of this patient.       HISTORY     CC: Hypoxia and pain HPI: The patient is a 64 y.o. female with pain in her RLE. She present to the hospital from an ECF on 12/24/20. At that time she was having increased pain and erythema of her RLE. In the ER she was found to have cellulitis and a CT scan was performed. The CT scan showed her to have atherosclerotic calcification on the vasculature, fluid in the subcutaneous fat of the right lower abdominal and anterior pelvic wall with moderate circumferential edema in the subcutaneous fat around the right thigh and right upper leg. ID has been following her and treating the cellulitis with antibiotics. She does have a significant vascular history and has been treated by Dr. Karyna Carcamo. Of note she has a RLE femoral artery to mid anterior tibial bypass graft. Palliative Medicine SymptomScreening/ROS:  Review of Systems - History obtained from the patient  General ROS: positive for  - fatigue and malaise  Respiratory ROS: positive for - shortness of breath  Cardiovascular ROS: positive for - dyspnea on exertion and edema  Musculoskeletal ROS: positive for - muscular weakness  Neurological ROS: no TIA or stroke symptoms    A complete 10 count ROS was obtained. Pertinent positives mentioned above in HPI/ROS. All others if not mentioned are negative.        Pain:  Controlled    Home med list and hospital medications reviewed in chart as of 12/29/2020     EXAM     Vitals:    12/29/20 0902   BP:    Pulse:    Resp:    Temp:    SpO2: 93%       Physical Examination: General appearance - oriented to person, place, and time  Mental status - normal mood, behavior, speech, dress, motor activity, and thought processes  Eyes - pupils equal and reactive, extraocular eye movements intact  Neck - supple, no significant adenopathy  Abdomen - soft, nontender, nondistended, no masses or organomegaly  Neurological - alert, oriented, normal speech, no focal findings or movement disorder noted  Musculoskeletal - no muscular tenderness noted Skin - normal coloration and turgor, no rashes, no suspicious skin lesions noted       Current labs in the epic chart reviewed as of 12/29/2020   Review of previous notes, admits, labs, radiology and testing relevant to this consult done in this chart today 12/29/2020    Signed By: Electronically signed by MARILEE Arce CNP on 12/29/2020 at [address]   [phone]    December 29, 2020

## 2020-12-29 NOTE — CARE COORDINATION
Sw received call from Nevaeh at [facility]. Patient was on the skilled unit. She can accept back but bed is not being held. Patient would need a COVID test 48 hours prior to admit.     Electronically signed by TYLER Hope, [place], Case Management on 12/29/2020 at 1:27 PM  [place] [phone]

## 2020-12-29 NOTE — PROGRESS NOTES
Dr. Dylan Hopper ordered abdominal CT with oral contrast. Patient is on fluid restriction and having intermittent nausea. Dr. Dylan Hopper aware and notified. Ok to drink contrast with water espite fluid restriction, and to go ahead with the CT scan regardless of how much of the oral contrast the patient drinks.  Electronically signed by David Champagne RN on 12/29/2020

## 2020-12-29 NOTE — PROGRESS NOTES
Sepsis protocol notification alerting this nurse in [address] [place] [address] Rd. Dr. Nadege Mancini aware and notified. No new orders at this time.  Electronically signed by Helen Noe RN on 12/29/2020

## 2020-12-29 NOTE — CONSULTS
Priyanka 89  1960    Reason for Consult: Pulmonary Hypertension    CC: Leg Pain    HPI:  The patient is 64 y.o. female with a past medical history significant for ILD with chronic hypoxic respiratory failure (2 liters chronically at home), Scleroderam, PAH and PAD s/p Left BKA who presented to [facility] with right leg pain. I was asked to see her for PAH and diastolic dysfunction. The leg pain has been present for some time but she will not say how long. The patient is difficult to focus on answering questions I ask. She denies any worsening of her shortness of breath above her baseline. She denies any chest pain or pressure. Further evaluation is not possible as the patient will not answer questions for me. Per the chart, she had followed with Dr. Elmira Mccrary in the past for her [facility] but does not now. Review of Systems:  As above and otherwise unobtainable due to lack of patient cooperation. Past Medical History:   Diagnosis Date    Abnormal uterine bleeding     Cellulitis 2013    left foot    Hypertension     Peripheral vascular disease ([facility] [address]     Scleroderma ([facility] [address]      Past Surgical History:   Procedure Laterality Date    AMPUTATION  2013    left foot 5th toe    HYSTERECTOMY      total with both tubes and ovaries     No family history on file.   Social History     Tobacco Use    Smoking status: Current Every Day Smoker     Packs/day: 0.50     Years: 10.00     Pack years: 5.00     Types: Cigarettes     Last attempt to quit: 2013     Years since quittin.9    Smokeless tobacco: Never Used   Substance Use Topics    Alcohol use: Yes     Comment: drinks beer - 4-6 cans per day - quit     Drug use: No       No Known Allergies  Current Facility-Administered Medications   Medication Dose Route Frequency Provider Last Rate Last Admin    iohexol (OMNIPAQUE 240) 240 MG/ML injection  enoxaparin (LOVENOX) injection 40 mg  40 mg Subcutaneous Daily Vannesa Gallardo MD   40 mg at 12/29/20 1256    menthol-zinc oxide (CALMOSEPTINE) 0.44-20.6 % ointment   Topical BID PRN Vannesa Gallardo MD        furosemide (LASIX) 100 mg in dextrose 5 % 100 mL infusion  5 mg/hr Intravenous Continuous Vannesa Gallardo MD 5 mL/hr at 12/29/20 1535 5 mg/hr at 12/29/20 1535    cefepime (MAXIPIME) 1 g IVPB minibag  1 g Intravenous Q12H Brandon Simms MD   Stopped at 12/29/20 1220    HYDROmorphone (DILAUDID) injection 0.25 mg  0.25 mg Intravenous Q3H PRN Vannesa Gallardo MD        Or    HYDROmorphone (DILAUDID) injection 0.5 mg  0.5 mg Intravenous Q3H PRN Vannesa Gallardo MD   0.5 mg at 12/29/20 1002    ondansetron (ZOFRAN) injection 4 mg  4 mg Intravenous Q6H PRN Andree Saenz PA-C        linezolid (ZYVOX) IVPB 600 mg  600 mg Intravenous Q12H Brandon Simms MD   Stopped at 12/29/20 1355    lidocaine PF 1 % injection 5 mL  5 mL Intradermal Once Lesley López MD        sodium chloride flush 0.9 % injection 10 mL  10 mL Intravenous 2 times per day Dora Young MD   10 mL at 12/29/20 1004    sodium chloride flush 0.9 % injection 10 mL  10 mL Intravenous PRN Dora Young MD        midodrine (PROAMATINE) tablet 10 mg  10 mg Oral TID WC Lesley López MD   10 mg at 12/29/20 1255    oxyCODONE-acetaminophen (PERCOCET) 5-325 MG per tablet 1 tablet  1 tablet Oral Q6H PRN Dora Young MD   1 tablet at 12/28/20 2004    nystatin (MYCOSTATIN) ointment   Topical BID Dora Young MD   Given at 12/29/20 1004    perflutren lipid microspheres (DEFINITY) injection 1.65 mg  1.5 mL Intravenous ONCE PRN Lesley López MD        polyethylene glycol (GLYCOLAX) packet 17 g  17 g Oral Daily Dora Young MD   17 g at 12/29/20 0804    morphine (PF) injection 4 mg  4 mg Intravenous Once Dora Young MD   Stopped at 12/24/20 1502  acetaminophen (TYLENOL) tablet 650 mg  650 mg Oral Q6H PRN Rudi Fabry, MD   650 mg at 12/26/20 2331    Or    acetaminophen (TYLENOL) suppository 650 mg  650 mg Rectal Q6H PRN Rudi Fabry, MD        aspirin chewable tablet 81 mg  81 mg Oral Daily Rudi Fabry, MD   81 mg at 12/29/20 0804    [Held by provider] atorvastatin (LIPITOR) tablet 20 mg  20 mg Oral Nightly Rudi Fabry, MD   Stopped at 12/25/20 2100    bisacodyl (DULCOLAX) suppository 10 mg  10 mg Rectal Daily PRN Rudi Fabry, MD   10 mg at 12/26/20 2036    ferrous sulfate EC tablet 324 mg  324 mg Oral TID WC Rudi Fabry, MD   324 mg at 12/29/20 1255    hydrOXYzine (ATARAX) tablet 25 mg  25 mg Oral Q8H PRN Rudi Fabry, MD   25 mg at 12/27/20 1700    macitentan TABS 10 mg  10 mg Oral Daily Rudi Fabry, MD   10 mg at 12/29/20 1002    magnesium oxide (MAG-OX) tablet 400 mg  400 mg Oral Daily Rudi Fabry, MD   400 mg at 12/29/20 0804    therapeutic multivitamin-minerals 1 tablet  1 tablet Oral Daily Rudi Fabry, MD   1 tablet at 12/29/20 0804    pantoprazole (PROTONIX) tablet 40 mg  40 mg Oral BID AC Rudi Fabry, MD   40 mg at 12/29/20 0511    sildenafil (REVATIO) tablet 20 mg  20 mg Oral TID Rudi Fabry, MD   20 mg at 12/29/20 1404       Physical Exam:   /70   Pulse 109   Temp 97.4 °F (36.3 °C) (Oral)   Resp 15   Ht 5' 3\" (1.6 m)   Wt 156 lb 1.4 oz (70.8 kg)   SpO2 93%   BMI 27.65 kg/m²     Intake/Output Summary (Last 24 hours) at 12/29/2020 1557  Last data filed at 12/29/2020 1351  Gross per 24 hour   Intake 480 ml   Output 575 ml   Net -95 ml     Wt Readings from Last 2 Encounters:   12/29/20 156 lb 1.4 oz (70.8 kg)   08/20/18 159 lb 8 oz (72.3 kg)     Constitutional: She is oriented to person but is somewhat disoriented otherwise. . She appears chonically ill and mildly distressed. Head: Normocephalic and atraumatic. Neck: Neck supple. No JVD present. Carotid bruit is not present. No mass and no thyromegaly present. No lymphadenopathy present. Cardiovascular: Normal rate, regular rhythm, normal heart sounds and intact distal pulses. Exam reveals no gallop and no friction rub. No murmur heard. Pulmonary/Chest: Effort normal and breath sounds normal. No respiratory distress. She has no wheezes, rhonchi or rales. Abdominal: Soft, non-tender. Bowel sounds and aorta are normal. She exhibits no organomegaly, mass or bruit. Extremities: Left BKA. No edema, cyanosis, or clubbing. Neurological: She is alert and oriented to person, place, and time. She has normal reflexes. No cranial nerve deficit. Coordination normal.   Skin: Skin is warm and dry. There is no rash or diaphoresis. Psychiatric: She has a normal mood and affect.  Her speech is normal and behavior is normal.   Lab Review:   Lab Results   Component Value Date    TRIG 47 08/16/2018    HDL 34 08/16/2018    LDLCALC 33 08/16/2018    LABVLDL 9 08/16/2018     Lab Results   Component Value Date     12/29/2020    K 4.0 12/29/2020    K 3.7 12/24/2020    BUN 36 12/29/2020    CREATININE 1.5 12/29/2020     Recent Labs     12/28/20  0520 12/29/20  0635   WBC 13.9* 17.3*   HGB 8.2* 9.1*   HCT 26.5* 28.8*    285     CT abdomen/Pelvis 12/29/2020:  Evaluation is limited in the absence of intravenous contrast and the paucity   of intrinsic contrast due to diffuse graying of the intra-fat and free fluid,   some of which is mildly complex.       Constellation of findings suggestive of positive fluid balance including   diffuse anasarca, graying of the intraperitoneal fat with free fluid,   moderate bilateral pleural effusions, pericardial effusion, and pulmonary   edema.       Asymmetric swelling and edema involving the right anterolateral chest wall   and breast as well as the partially imaged right upper extremity.  While this may relate to asymmetric edema possibly due to patient positioning, recommend   correlation with physical exam to exclude any concern for cellulitis or   potential vascular etiologies.       Consolidative changes are suggested in the bilateral lower lobes suspicious   for pneumonia in the appropriate clinical setting superimposed on the   patient's underlying interstitial lung disease and lower lobe atelectasis   from the moderate pleural effusions. Lower extremity venous Dopplers 12/28/2020:  No evidence of deep vein or superficial vein thrombosis involving the right    lower extremity.        No evidence of deep vein or superficial vein thrombosis involving the left    lower extremity S/P left BKA. Echo 12/28/2020:  Ejection fraction is visually estimated to be 55-60%. Grade 1 diastolic dysfunction   Severly dilated right ventricle. Right ventricular systolic function is reduced . Evidence of pressure and   volume overload onto the LV w/ D shaped septum   Severe tricuspid regurgitation. There is a small to moderate circumferential pericardial effusion noted. No   tamponade physiology   Estimated pulmonary artery systolic pressure is elevated at 70 mmHg assuming   a right atrial pressure of 8 mmHg. Assessment:  1   Pulmonary arterial hypertension  2. Chronic hypoxic respiratory failure  3. Chronic diastolic CHF, class III  4.   PAD    Plan: It seems that Gurvinder Carlos likely has end-stage pulmonary arterial hypertension secondary to her scleroderma. She has a pericardial effusion noted on her echocardiogram which would be an additional poor prognostic indicator with PAH. She seems chronically debilitated and may not have been compliant with therapy in the first place. I do not think that there is much that I would offer her at present. If she recovered to the point where she could leave the hospital I think that she could follow-up with Dr. Julisa Good in the office. I think she can continue on the Lasix drip if it is helping with her respiratory status. I am not sure that it will. She likely has chronic diastolic dysfunction. Her elevated pulmonary arterial pressures though I do not think are from the diastolic dysfunction but more likely from her pulmonary arterial hypertension. We can follow along peripherally but I do not know that I have much more to add.

## 2020-12-30 NOTE — PROGRESS NOTES
Patient is moaning and crying in pain. Patients BP is 80/54 and patient remains on a lasixs drip. NP Lelon Goodpasture Puthoff notified. New orders placed. Will continue to monitor.   Electronically signed by Selena Dooley RN on 12/29/2020 at 10:41 PM

## 2020-12-30 NOTE — CODE DOCUMENTATION
Chest compressions started at 1034. Epi at 1037. Chest compressions continued. Pulse check, pulse absent. 1040 2nd dose of Epi  Dr Wanda León on phone with family discussing direction. 1040 Pulse check and still absent.   Prounced at 1041

## 2020-12-30 NOTE — PROGRESS NOTES
Security notified that family is finished seeing patient, patient placed in body bag, identification tags placed on patient, death checklist complete.  Electronically signed by Yash Lozoya RN on 12/30/2020 at 6:24 PM

## 2020-12-30 NOTE — DISCHARGE SUMMARY
Hospital Medicine Discharge Summary    Patient ID: Darwin Wright      Patient's PCP: Myrna Gonzalez MD    Admit Date: 12/24/2020     Discharge Date:   12/30/20    Admitting Physician: Naman Dyson MD     Discharge Physician: Michaela Ohara MD     Discharge Diagnoses: Active Hospital Problems    Diagnosis Date Noted    Pulmonary arterial hypertension ([facility] [address] [I27.21]      Priority: High    Centrilobular emphysema (HCC) [J43.2]     ILD (interstitial lung disease) ([facility] [address] [J84.9]     Cellulitis [L03.90] 12/24/2020       The patient was seen and examined on day of discharge and this discharge summary is in conjunction with any daily progress note from day of discharge.     Hospital Course: Patient was admitted to the hospital with right lower extremity pain from the nursing home. Patient is well-known due to her chronic issues including interstitial lung disease, crest, pulmonary arterial hypertension, chronic kidney disease, chronic respiratory failure with hypoxia. She was recently admitted to an outside facility secondary to respiratory failure along with chronic diastolic CHF exacerbation with gram-negative pneumonia. She was started on IV Lasix and antibiotics and then eventually this was complicated with hypotension along with JESSICA. Patient was started on IV diuretics and dobutamine and the course of treatment was complicated secondary to acute blood loss anemia secondary to an acute GI bleed. Patient eventually stabilized and was sent to a nursing home on 2 to 3 L of supplemental oxygen. In our emergency department she was found to have right lower extremity pain and there was concern for cellulitis and/or occlusion. Vascular surgery was consulted and lower extremity Dopplers were performed and were negative. CT of her lower extremity was ordered and did show edema all the way up to her lower abdomen. She was started on broad-spectrum IV antibiotics. Her chronic respiratory failure with hypoxia worsened and her oxygen demand increased. The patient's renal function continued to deteriorate even with IV Lasix. Cardiology and pulmonology were consulted due to her extensive cardiac and lung issues. Unfortunately there was not much more that we could offer the patient that has not already been done before. On December 30 the patient and I had a conversation regarding end-of-life care along with possible hospice and she had agreed to go into hospice therapy. Approximately 30 minutes later the patient had shortness of breath difficulty breathing then eventually went PEA and had a cardiac arrest.  CPR was performed along with multiple rounds of epinephrine.   The code itself lasted for approximately 10 minutes without return of spontaneous circulation. I discussed the situation with the patient's daughter and she decided to withdraw care. Patient was pronounced dead at 10:41 AM on December 30, 2020    Exam:     BP (!) 88/52   Pulse 108   Temp 98.3 °F (36.8 °C) (Oral)   Resp 16   Ht 5' 3\" (1.6 m)   Wt 157 lb 6.5 oz (71.4 kg)   SpO2 95%   BMI 27.88 kg/m²   See previous      Consults:     IP CONSULT TO HOSPITALIST  IP CONSULT TO SOCIAL WORK  IP CONSULT TO INFECTIOUS DISEASES  IP CONSULT TO VASCULAR SURGERY  IP CONSULT TO PALLIATIVE CARE  IP CONSULT TO VASCULAR SURGERY  IP CONSULT TO CARDIOLOGY  IP CONSULT TO PULMONOLOGY  IP CONSULT TO SPIRITUAL SERVICES  IP CONSULT TO HOSPICE    Significant Diagnostic Studies:     CT ABDOMEN PELVIS WO CONTRAST Additional Contrast? Oral   Final Result   Evaluation is limited in the absence of intravenous contrast and the paucity   of intrinsic contrast due to diffuse graying of the intra-fat and free fluid,   some of which is mildly complex. Constellation of findings suggestive of positive fluid balance including   diffuse anasarca, graying of the intraperitoneal fat with free fluid,   moderate bilateral pleural effusions, pericardial effusion, and pulmonary   edema. Asymmetric swelling and edema involving the right anterolateral chest wall   and breast as well as the partially imaged right upper extremity. While this   may relate to asymmetric edema possibly due to patient positioning, recommend   correlation with physical exam to exclude any concern for cellulitis or   potential vascular etiologies. Consolidative changes are suggested in the bilateral lower lobes suspicious   for pneumonia in the appropriate clinical setting superimposed on the   patient's underlying interstitial lung disease and lower lobe atelectasis   from the moderate pleural effusions.          VL Extremity Venous Bilateral   Final Result      XR CHEST PORTABLE Final Result   Mild multifocal bilateral pneumonia. CT TIBIA FIBULA RIGHT WO CONTRAST   Final Result   CT right femur:      1. Diffuse osteopenia. No acute osseous abnormality. No aggressive osseous   destruction. 2. 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 Small to moderate right knee   suprapatellar joint effusion. 3. Atherosclerotic calcification of the vasculature. 4. Mild right hip osteoarthrosis. 5. Fluid in the subcutaneous fat of the right lower abdominal and anterior   pelvic wall. Moderate circumferential edema in the subcutaneous fat about   the right thigh/right upper leg. 6. Mild-to-moderate atrophy and fatty degeneration of the right thigh   musculature. CT right tib fib:      1. Diffuse osteopenia. No acute osseous abnormality. No aggressive osseous   destruction. 2. Severe atherosclerotic calcification of the vasculature with probable   occlusive or near occlusive calcification at the level of the popliteal   artery. 3. Moderate edema in the subcutaneous fat about the right leg. 4. Mild-to-moderate atrophy and fatty degeneration of the visualized   musculature. 5. Severe narrowing of the lateral patellofemoral compartment. 6. Small tibiotalar and small to moderate right knee suprapatellar joint   effusions. CT FEMUR RIGHT WO CONTRAST   Final Result   CT right femur:      1. Diffuse osteopenia. No acute osseous abnormality. No aggressive osseous   destruction. 2. 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 Small to moderate right knee   suprapatellar joint effusion. 3. Atherosclerotic calcification of the vasculature. 4. Mild right hip osteoarthrosis.    5. Fluid in the subcutaneous fat of the right lower abdominal and anterior pelvic wall. Moderate circumferential edema in the subcutaneous fat about   the right thigh/right upper leg. 6. Mild-to-moderate atrophy and fatty degeneration of the right thigh   musculature. CT right tib fib:      1. Diffuse osteopenia. No acute osseous abnormality. No aggressive osseous   destruction. 2. Severe atherosclerotic calcification of the vasculature with probable   occlusive or near occlusive calcification at the level of the popliteal   artery. 3. Moderate edema in the subcutaneous fat about the right leg. 4. Mild-to-moderate atrophy and fatty degeneration of the visualized   musculature. 5. Severe narrowing of the lateral patellofemoral compartment. 6. Small tibiotalar and small to moderate right knee suprapatellar joint   effusions. XR TIBIA FIBULA RIGHT (2 VIEWS)   Final Result   Diffuse demineralized bones without convincing radiographic evidence of   discrete cortical erosion or mario bony destructive changes are seen. If   there is persistent clinical concern for acute osteomyelitis, MRI is   recommended for further evaluation. Disposition:     Condition at Discharge:     Discharge Instructions/Follow-up: Not applicable    Code Status:  Full Code     Activity: activity as tolerated    Diet: Not applicable      Labs:  For convenience and continuity at follow-up the following most recent labs are provided:      CBC:    Lab Results   Component Value Date    WBC 18.6 2020    HGB 8.4 2020    HCT 28.3 2020     2020       Renal:    Lab Results   Component Value Date     2020    K 3.3 2020    K 3.7 2020     2020    CO2 19 2020    BUN 35 2020    CREATININE 1.7 2020    CALCIUM 8.7 2020    PHOS 2.9 2020       Discharge Medications:     Current Discharge Medication List           Details   torsemide (DEMADEX) 20 MG tablet Take 40 mg by mouth 2 times daily sildenafil (REVATIO) 20 MG tablet Take 20 mg by mouth 3 times daily      sodium chloride (OCEAN, BABY AYR) 0.65 % nasal spray 1 spray by Nasal route as needed for Congestion      macitentan 10 MG TABS Take 10 mg by mouth daily      allopurinol (ZYLOPRIM) 100 MG tablet Take 200 mg by mouth daily      midodrine (PROAMATINE) 10 MG tablet Take 10 mg by mouth 3 times daily      bisacodyl (DULCOLAX) 10 MG suppository Place 10 mg rectally daily as needed for Constipation       ferrous sulfate (FE TABS 325) 325 (65 Fe) MG EC tablet Take 325 mg by mouth 3 times daily (with meals)      magnesium oxide (MAG-OX) 400 MG tablet Take 400 mg by mouth daily      polyethylene glycol (GLYCOLAX) 17 g packet Take 17 g by mouth daily as needed for Constipation      nystatin (MYCOSTATIN) 068472 UNIT/GM powder Apply topically 2 times daily Apply topically under breasts      magnesium hydroxide (MILK OF MAGNESIA) 400 MG/5ML suspension Take by mouth daily as needed for Constipation      !! omeprazole (PRILOSEC) 40 MG delayed release capsule Take 40 mg by mouth 2 times daily      oxyCODONE-acetaminophen (PERCOCET) 5-325 MG per tablet Take 1 tablet by mouth every 4 hours as needed for Pain.      potassium chloride (KLOR-CON M) 20 MEQ extended release tablet Take 20 mEq by mouth daily      !! omeprazole (PRILOSEC) 40 MG delayed release capsule Take 40 mg by mouth 2 times daily      Multiple Vitamins-Minerals (THERAPEUTIC MULTIVITAMIN-MINERALS) tablet Take 1 tablet by mouth daily      vitamin D3 (CHOLECALCIFEROL) 25 MCG (1000 UT) TABS tablet Take 2,000 Units by mouth daily       atorvastatin (LIPITOR) 20 MG tablet Take 20 mg by mouth nightly      aspirin 81 MG chewable tablet Take 81 mg by mouth daily       vitamin C (ASCORBIC ACID) 500 MG tablet Take 500 mg by mouth daily      triamcinolone (KENALOG) 0.1 % cream Apply 1 each topically 2 times daily Apply topically 2 times daily. hydrOXYzine (ATARAX) 25 MG tablet Take 25 mg by mouth every 8 hours as needed for Anxiety       acetaminophen (TYLENOL) 325 MG tablet Take 650 mg by mouth every 6 hours as needed for Pain or Fever        !! - Potential duplicate medications found. Please discuss with provider. No future appointments. Time Spent on discharge is more than 30 minutes in the examination, evaluation, counseling and review of medications and discharge plan. Signed:    Chico Harvey MD   12/30/2020      Thank you Cathleen Quach MD for the opportunity to be involved in this patient's care. If you have any questions or concerns please feel free to contact me at [phone].

## 2020-12-30 NOTE — PLAN OF CARE
Problem: Falls - Risk of:  Goal: Will remain free from falls  Description: Will remain free from falls  12/29/2020 2253 by Rexanne Krabbe, RN  Outcome: Ongoing  Note: Fall risk assessment completed. Fall precautions in place. Call light within reach. Pt educated on calling for assistance before getting up. Walkway free of clutter. Will continue to monitor. 12/29/2020 1126 by Benny Cheung RN  Outcome: Ongoing  Goal: Absence of physical injury  Description: Absence of physical injury  12/29/2020 2253 by Rexanne Krabbe, RN  Outcome: Ongoing  Note: Pt is free of injury. No injury noted. Fall precautions in place. Call light within reach. Will monitor. 12/29/2020 1126 by Benny Cheung RN  Outcome: Ongoing     Problem: Skin Integrity:  Goal: Will show no infection signs and symptoms  Description: Will show no infection signs and symptoms  12/29/2020 2253 by Rexanne Krabbe, RN  Outcome: Ongoing  Note: Pt is free of signs and symptoms of infection. Incision and dressing are clean, dry and intact. Vital signs stable. Will monitor.      12/29/2020 1126 by Benny Cheung RN  Outcome: Ongoing  Goal: Absence of new skin breakdown  Description: Absence of new skin breakdown  12/29/2020 2253 by Rexanne Krabbe, RN  Outcome: Ongoing  Note: Patient will be absent of new skin breakdown    12/29/2020 1126 by Benny Cheung RN  Outcome: Ongoing  Goal: Risk for impaired skin integrity will decrease  Description: Risk for impaired skin integrity will decrease  12/29/2020 2253 by Rexanne Krabbe, RN  Outcome: Ongoing  Note: Patients skin will remain stable   12/29/2020 1126 by Benny Cheung RN  Outcome: Ongoing     Problem: Pain:  Goal: Pain level will decrease  Description: Pain level will decrease  12/29/2020 2253 by Rexanne Krabbe, RN  Outcome: Ongoing  12/29/2020 1126 by eBnny Cheung RN  Outcome: Ongoing  Goal: Control of acute pain  Description: Control of acute pain  12/29/2020 2253 by Rexanne Krabbe, RN Outcome: Ongoing  Note: Pt assessed for pain. Pt in pain and assessed with 0-10 pain rating scale. Pt given prescribed analgesic for pain. (See eMar) Pt satisfied with pain relief thus far. Will reassess and continue to monitor.      12/29/2020 1126 by Ebenezer Mendoza RN  Outcome: Ongoing  Goal: Control of chronic pain  Description: Control of chronic pain  12/29/2020 2253 by Azalea Mejía RN  Outcome: Ongoing  12/29/2020 1126 by Ebenezer Mendoza RN  Outcome: Ongoing     Problem: Discharge Planning:  Goal: Discharged to appropriate level of care  Description: Discharged to appropriate level of care  12/29/2020 2253 by Azalea Mejía RN  Outcome: Ongoing  Note: Patient will be discharged to appropriate level of  care    12/29/2020 1126 by Ebenezer Mendoza RN  Outcome: Ongoing     Problem: Fluid Volume:  Goal: Ability to achieve a balanced intake and output will improve  Description: Ability to achieve a balanced intake and output will improve  12/29/2020 2253 by Azalea Mejía RN  Outcome: Ongoing  Note: Monitoring patients I&O  12/29/2020 1126 by Ebenezer Mendoza RN  Outcome: Ongoing     Problem: Physical Regulation:  Goal: Ability to maintain clinical measurements within normal limits will improve  Description: Ability to maintain clinical measurements within normal limits will improve  12/29/2020 2253 by Azalea Mejía RN  Outcome: Ongoing  12/29/2020 1126 by Ebenezer Mendoza RN  Outcome: Ongoing  Goal: Will show no signs and symptoms of electrolyte imbalance  Description: Will show no signs and symptoms of electrolyte imbalance  12/29/2020 2253 by Azalea Mejía RN  Outcome: Ongoing  Note: Monitoring patients labs    12/29/2020 1126 by Ebenezer Mendoza RN  Outcome: Ongoing     Problem: Breathing Pattern - Ineffective:  Goal: Ability to achieve and maintain a regular respiratory rate will improve  Description: Ability to achieve and maintain a regular respiratory rate will improve 12/29/2020 2253 by Oneida Fernandez RN  Outcome: Ongoing  Note: Patients respiratory status will improve    12/29/2020 1126 by Clare Rojas RN  Outcome: Ongoing     Problem: Fluid Volume - Imbalance:  Goal: Absence of imbalanced fluid volume signs and symptoms  Description: Absence of imbalanced fluid volume signs and symptoms  12/29/2020 2253 by Oneida Fernandez RN  Outcome: Ongoing  Note: Monitoring signs and symptoms of fluid imbalance    12/29/2020 1126 by Clare Rjoas RN  Outcome: Ongoing     Problem: Activity:  Goal: Ability to tolerate increased activity will improve  Description: Ability to tolerate increased activity will improve  12/29/2020 2253 by Oneida Fernandez RN  Outcome: Ongoing  Note: Patients mobility will improve    12/29/2020 1126 by Clare Rojas RN  Outcome: Ongoing     Problem: Mobility - Impaired:  Goal: Mobility will improve  Description: Mobility will improve  12/29/2020 2253 by Oneida Fernandez RN  Outcome: Ongoing  12/29/2020 1126 by lCare Rojas RN  Outcome: Ongoing     Problem:  Bowel/Gastric:  Goal: Control of bowel function will improve  Description: Control of bowel function will improve  12/29/2020 2253 by Oneida Fernandez RN  Outcome: Ongoing  12/29/2020 1126 by Clare Rojas RN  Outcome: Ongoing  Goal: Ability to achieve a regular elimination pattern will improve  Description: Ability to achieve a regular elimination pattern will improve  12/29/2020 2253 by Oneida Fernandez RN  Outcome: Ongoing  Note: Patients elimination pattern will improve    12/29/2020 1126 by Clare Rojas RN  Outcome: Ongoing     Problem: Nutritional:  Goal: Ability to follow a diet with enough fiber (20 to 30 grams) for normal bowel function will improve  Description: Ability to follow a diet with enough fiber (20 to 30 grams) for normal bowel function will improve  12/29/2020 2253 by Oneida Fernandez RN  Outcome: Ongoing  12/29/2020 1126 by Clare Rojas RN  Outcome: Ongoing Problem: SAFETY  Goal: Free from accidental physical injury  12/29/2020 2253 by Oneida Fernandez RN  Outcome: Ongoing  Note: Pt is free of injury. No injury noted. Fall precautions in place. Call light within reach. Will monitor. 12/29/2020 1126 by Clare Rojas RN  Outcome: Ongoing  Goal: Free from intentional harm  12/29/2020 2253 by Oneida Fernandez RN  Outcome: Ongoing  Note: Pt is free of intentional harm. No intentions noted. Will monitor. 12/29/2020 1126 by Clare Rojas RN  Outcome: Ongoing     Problem: DAILY CARE  Goal: Daily care needs are met  12/29/2020 2253 by Oneida Fernandez RN  Outcome: Ongoing  Note: Patients daily care needs will be met at this time    12/29/2020 1126 by Clare Rojas RN  Outcome: Ongoing     Problem: PAIN  Goal: Patient's pain/discomfort is manageable  12/29/2020 2253 by Onedia Fernandez RN  Outcome: Ongoing  Note: Pt assessed for pain. Pt in pain and assessed with 0-10 pain rating scale. Pt given prescribed analgesic for pain. (See eMar) Pt satisfied with pain relief thus far. Will reassess and continue to monitor. 12/29/2020 1126 by Clare Rojas RN  Outcome: Ongoing     Problem: SKIN INTEGRITY  Goal: Skin integrity is maintained or improved  12/29/2020 2253 by Oneida Fernandez RN  Outcome: Ongoing  12/29/2020 1126 by Clare Rojas RN  Outcome: Ongoing     Problem: KNOWLEDGE DEFICIT  Goal: Patient/S.O. demonstrates understanding of disease process, treatment plan, medications, and discharge instructions.   12/29/2020 2253 by Oneida Fernandez RN  Outcome: Ongoing  12/29/2020 1126 by Clare Rojas RN  Outcome: Ongoing     Problem: DISCHARGE BARRIERS  Goal: Patient's continuum of care needs are met  12/29/2020 2253 by Oneida Fernandez RN  Outcome: Ongoing  12/29/2020 1126 by Clare Rojas RN  Outcome: Ongoing   Electronically signed by Oneida Fernandez RN on 12/29/2020 at 10:57 PM

## 2020-12-30 NOTE — PROGRESS NOTES
Daughter came to hospital given all patient belongings daughter gone to go  grandmother and aunt to see patient.  Electronically signed by Magdiel Mccain RN on 12/30/2020 at 12:41 PM

## 2020-12-30 NOTE — PROGRESS NOTES
Physician Progress Note      PATIENT:               Ed Luna  CSN #:                  [id number]  :                       1960  ADMIT DATE:       2020 9:03 AM  DISCH DATE:  RESPONDING  PROVIDER #:        Kathy Jorge MD          QUERY TEXT:    Patient admitted with sepsis, gram negative pneumonia, and cellulitis of right   leg. Initial sat 93% on O2 at 2L. Rapid response called on  for acute   respiratory failure with hypoxia and placed on NRB. Currently requiring O2 at   7L to maintain O2 sat 97%. Chronic respiratory failure with hypoxia   secondary to pulmonary arterial hypertension, continue with home dose of   oxygen documented in progress note on     If possible, please document in the progress notes and discharge summary if   you are evaluating and /or treating any of the following: The medical record reflects the following:  Risk Factors: pulmonary artery hypertension, sepsis, gram negative pneumonia  Clinical Indicators: Initial sat 93% on O2 at 2L. Rapid response called on    for acute respiratory failure with hypoxia and placed on NRB. Currently   requiring O2 at 7L to maintain O2 sat 97%  Treatment: supplemental O@    Thank you,  Jai Barton RN, BSN, CCDS  [facility]  Options provided:  -- Acute on chronic respiratory failure confirmed  -- Chronic respiratory failure confirmed and acute respiratory failure ruled   out  -- Chronic respiratory failure confirmed and acute respiratory failure   resolved  -- Other - I will add my own diagnosis  -- Disagree - Not applicable / Not valid  -- Disagree - Clinically unable to determine / Unknown  -- Refer to Clinical Documentation Reviewer    PROVIDER RESPONSE TEXT:    Acute on chronic respiratory failure confirmed.     Query created by: Jamir Montero on 2020 9:07 AM      Electronically signed by:  Kathy Jorge MD 2020 9:38 AM

## 2020-12-30 NOTE — DEATH NOTES
Death Pronouncement Note  Patient's Name: Loida Dorantes   Patient's YOB: 1960  MRN Number: [id number]    Admitting Provider: Belkis Dias MD  Attending Provider: Sarah Talavera MD    Patient was examined and the following were absent: Pulses, Blood Pressure and Respiratory effort    I declared the patient dead on 12/30/20 at 10:41AM    Preliminary Cause of Death: acute on chronic resp failure with hypoxia    Electronically signed by Sarah Talavera MD on 12/30/20 at 10:43 AM EST

## 2020-12-30 NOTE — PROGRESS NOTES
Hospitalist Progress Note      PCP: Crystal Levi MD    Date of Admission: 12/24/2020    Chief Complaint: Right lower extremity pain    Hospital Course:     12/29 CT of the abdomen pelvis performed today showing diffuse anasarca. Patient not really diuresing well on the Lasix twice daily so I will transition her over to a Lasix drip. Will need to keep an eye on her creatinine levels. I consulted pulmonology along with cardiology in regards to her acute on chronic diastolic heart failure exacerbation along with her interstitial lung disease/pulmonary arterial hypertension. I am unsure what else we can offer the patient for her pulmonary arterial hypertension. As far as the heart failure goes I think transitioning to Lasix drip to help get off some this fluid may help her. My concern is that this will further diminish her overall renal function. Subjective: Patient seen and examined. Discussed situation with patient in regards to her incurable lung pathology and likely cardiac pathology. She agreed that she is in pain and continues to suffer. She was agreeable to hospice and I consulted [facility] per her request.     shortly after I left the patient's room nursing called me because the patient was having difficulty with breathing. A rapid response was called and eventually turned into a CODE BLUE she was found pulseless. CPR was started and 2 rounds of epinephrine were given. There was no return of spontaneous circulation. Family was called and updated on the patient's condition and her desire to go into hospice just 30 minutes earlier. Family decided to stop CPR. Pulse was checked and there was no pulse found. Patient was also apneic.   Patient was pronounced at 10:41 AM.    Medications:  Reviewed    Infusion Medications    furosemide (LASIX) 1mg/ml infusion 2.5 mg/hr (12/29/20 3300)     Scheduled Medications    enoxaparin  40 mg Subcutaneous Daily    cefepime  1 g Intravenous Q12H  linezolid  600 mg Intravenous Q12H    lidocaine 1 % injection  5 mL Intradermal Once    sodium chloride flush  10 mL Intravenous 2 times per day    midodrine  10 mg Oral TID WC    nystatin   Topical BID    polyethylene glycol  17 g Oral Daily    morphine  4 mg Intravenous Once    aspirin  81 mg Oral Daily    [Held by provider] atorvastatin  20 mg Oral Nightly    ferrous sulfate  324 mg Oral TID WC    macitentan  10 mg Oral Daily    magnesium oxide  400 mg Oral Daily    therapeutic multivitamin-minerals  1 tablet Oral Daily    pantoprazole  40 mg Oral BID AC    sildenafil  20 mg Oral TID     PRN Meds: menthol-zinc oxide, HYDROmorphone **OR** HYDROmorphone, ondansetron, sodium chloride flush, oxyCODONE-acetaminophen, perflutren lipid microspheres, acetaminophen **OR** acetaminophen, bisacodyl, hydrOXYzine      Intake/Output Summary (Last 24 hours) at 12/30/2020 0842  Last data filed at 12/29/2020 2230  Gross per 24 hour   Intake 470 ml   Output 350 ml   Net 120 ml       Physical Exam Performed:    BP (!) 88/52   Pulse 108   Temp 98.3 °F (36.8 °C) (Oral)   Resp 16   Ht 5' 3\" (1.6 m)   Wt 157 lb 6.5 oz (71.4 kg)   SpO2 95%   BMI 27.88 kg/m²     General appearance: No apparent distress, appears stated age and cooperative. HEENT: Pupils equal, round, and reactive to light. Conjunctivae/corneas clear. Neck: Supple, with full range of motion. No jugular venous distention. Trachea midline. Respiratory:  Normal respiratory effort. Cardiovascular: Regular rate and rhythm with normal S1/S2   Abdomen: Soft, non-tender, non-distended with normal bowel sounds. Musculoskeletal: Left BKA, +2 pitting edema bilateral lower extremities  Skin: Skin color, texture, turgor normal.  No rashes or lesions. Neurologic:  Neurovascularly intact without any focal sensory/motor deficits.  Cranial nerves: II-XII intact, grossly non-focal.  Psychiatric: Alert and oriented, thought content appropriate, normal insight Capillary Refill: Brisk,< 3 seconds   Peripheral Pulses: +2 palpable, equal bilaterally       Labs:   Recent Labs     12/28/20  0520 12/29/20  0635 12/30/20  0531   WBC 13.9* 17.3* 18.6*   HGB 8.2* 9.1* 8.4*   HCT 26.5* 28.8* 28.3*    285 251     Recent Labs     12/28/20  0520 12/29/20  0635 12/30/20  0716   * 136 135*   K 3.6 4.0 3.3*    102 102   CO2 19* 19* 19*   BUN 33* 36* 35*   CREATININE 1.5* 1.5* 1.7*   CALCIUM 8.4 8.5 8.7   PHOS 2.9 3.1 2.9     No results for input(s): AST, ALT, BILIDIR, BILITOT, ALKPHOS in the last 72 hours. No results for input(s): INR in the last 72 hours. No results for input(s): Pennie Foster in the last 72 hours. Urinalysis:    No results found for: Eugena Spell, BACTERIA, RBCUA, BLOODU, [address], [address]    Radiology:  CT ABDOMEN PELVIS WO CONTRAST Additional Contrast? Oral   Final Result   Evaluation is limited in the absence of intravenous contrast and the paucity   of intrinsic contrast due to diffuse graying of the intra-fat and free fluid,   some of which is mildly complex. Constellation of findings suggestive of positive fluid balance including   diffuse anasarca, graying of the intraperitoneal fat with free fluid,   moderate bilateral pleural effusions, pericardial effusion, and pulmonary   edema. Asymmetric swelling and edema involving the right anterolateral chest wall   and breast as well as the partially imaged right upper extremity. While this   may relate to asymmetric edema possibly due to patient positioning, recommend   correlation with physical exam to exclude any concern for cellulitis or   potential vascular etiologies. Consolidative changes are suggested in the bilateral lower lobes suspicious   for pneumonia in the appropriate clinical setting superimposed on the   patient's underlying interstitial lung disease and lower lobe atelectasis   from the moderate pleural effusions.          VL Extremity Venous Bilateral pelvic wall. Moderate circumferential edema in the subcutaneous fat about   the right thigh/right upper leg. 6. Mild-to-moderate atrophy and fatty degeneration of the right thigh   musculature. CT right tib fib:      1. Diffuse osteopenia. No acute osseous abnormality. No aggressive osseous   destruction. 2. Severe atherosclerotic calcification of the vasculature with probable   occlusive or near occlusive calcification at the level of the popliteal   artery. 3. Moderate edema in the subcutaneous fat about the right leg. 4. Mild-to-moderate atrophy and fatty degeneration of the visualized   musculature. 5. Severe narrowing of the lateral patellofemoral compartment. 6. Small tibiotalar and small to moderate right knee suprapatellar joint   effusions. XR TIBIA FIBULA RIGHT (2 VIEWS)   Final Result   Diffuse demineralized bones without convincing radiographic evidence of   discrete cortical erosion or mario bony destructive changes are seen. If   there is persistent clinical concern for acute osteomyelitis, MRI is   recommended for further evaluation.            From [place] Consult cardiology   Continue revatio    Sepsis, POA  Secondary to cellulitis  Blood cultures with no growth to date    Anemia  Continue to monitor    CREST  Monitor    Pulmonary Artery HTN  Monitor  continue home meds    DVT Prophylaxis: SCD  Diet: DIET CARDIAC; Dental Soft; Daily Fluid Restriction: 1800 ml  Code Status: Full Code    PT/OT Eval Status: Ordered    Dispo - Inpt, Hospice consulted    Bobbi Rivers MD     I spent greater than 30 minutes of critical care time with patient regards to her life-threatening cardiac arrest requiring CPR, epinephrine, cardiac monitoring. Patient eventually passed and care was withdrawn.

## 2020-12-30 NOTE — PROGRESS NOTES
This RN called to room by aid to help reposition patient, when this RN got to room patient was struggling to breathe and very SOB, physician contacted to come to room immediately, pt slow to respond with difficulty breathing and could not obtain an accurate SPO2 reading on her, pt heart rate dropping, Rapid Response called and turned to code blue when heart rate was lost and patient stopped breathing. Dr Ben Smiley to bedside, code team and supervisor along with charge all to bedside, CPR performed and daughter contacted. Time of death called by Dr Ben Smiley. See notes.  Electronically signed by Joelle Benton RN on 12/30/2020 at 11:58 AM]

## 2020-12-30 NOTE — PROGRESS NOTES
Patient is resting in bed. Alert and oriented X3, disoriented to time. Patient is delayed in responding. IV and midline remain in place. Patient remains on a specialty bed and turning patient Q2H. Albright remains in place and draining. Assessment complete. All patient needs are met at this time. Fall precautions are in place. Call light is in reach. Will continue to monitor.    Electronically signed by Dirk Cockayne, RN on 12/29/2020 at 10:59 PM

## 2020-12-30 NOTE — PROGRESS NOTES
 furosemide (LASIX) 1mg/ml infusion 2.5 mg/hr (20 2334)       PRN Meds:  menthol-zinc oxide, HYDROmorphone **OR** HYDROmorphone, ondansetron, sodium chloride flush, oxyCODONE-acetaminophen, perflutren lipid microspheres, acetaminophen **OR** acetaminophen, bisacodyl, hydrOXYzine    Labs:  CBC:   Recent Labs     20  0520 20  0635 20  0531   WBC 13.9* 17.3* 18.6*   HGB 8.2* 9.1* 8.4*   HCT 26.5* 28.8* 28.3*   MCV 92.4 90.5 94.7    285 251     BMP:   Recent Labs     20  0520 20  0635 20  0716   * 136 135*   K 3.6 4.0 3.3*    102 102   CO2 19* 19* 19*   PHOS 2.9 3.1 2.9   BUN 33* 36* 35*   CREATININE 1.5* 1.5* 1.7*     LIVER PROFILE: No results for input(s): AST, ALT, LIPASE, BILIDIR, BILITOT, ALKPHOS in the last 72 hours. Invalid input(s): AMYLASE,  ALB  PT/INR: No results for input(s): PROTIME, INR in the last 72 hours. APTT:   Recent Labs     20  1856 20  0110 20  1229   APTT >248.0* 151.0* 56.4*     UA:No results for input(s): NITRITE, COLORU, PHUR, LABCAST, WBCUA, RBCUA, MUCUS, TRICHOMONAS, YEAST, BACTERIA, CLARITYU, SPECGRAV, LEUKOCYTESUR, UROBILINOGEN, BILIRUBINUR, BLOODU, GLUCOSEU, AMORPHOUS in the last 72 hours. Invalid input(s): Anitha Chimes  No results for input(s): PH, PCO2, PO2 in the last 72 hours.         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 Not a candidate for any other therapy  · Diuresis  · Antibiotics  · Follow cultures  · DVT prophylaxis with lovenox      Recommend transfer to [facility] center should care continue but honestly she is likely out of options for her disease     Rising BNP is poor prognostic sign along with pericardial effusion (with regards to [facility])    CT abdomen with pleural effusion, atelectasis.   Impossible to say if pneumonia is present due to atelectasis plus she has an ILD too         Larry Hayes, DO [facility]